# Patient Record
Sex: FEMALE | Race: WHITE | NOT HISPANIC OR LATINO | Employment: OTHER | URBAN - METROPOLITAN AREA
[De-identification: names, ages, dates, MRNs, and addresses within clinical notes are randomized per-mention and may not be internally consistent; named-entity substitution may affect disease eponyms.]

---

## 2023-08-18 ENCOUNTER — HOSPITAL ENCOUNTER (OUTPATIENT)
Dept: DATA CONVERSION | Facility: HOSPITAL | Age: 78
Discharge: HOME | End: 2023-08-18

## 2023-08-18 DIAGNOSIS — E53.8 DEFICIENCY OF OTHER SPECIFIED B GROUP VITAMINS: ICD-10-CM

## 2023-08-18 DIAGNOSIS — E83.42 HYPOMAGNESEMIA: ICD-10-CM

## 2023-08-18 DIAGNOSIS — M19.90 UNSPECIFIED OSTEOARTHRITIS, UNSPECIFIED SITE: ICD-10-CM

## 2023-08-18 LAB
ALBUMIN SERPL-MCNC: 3.9 GM/DL (ref 3.5–5)
ALBUMIN/GLOB SERPL: 1 RATIO (ref 1.5–3)
ALP BLD-CCNC: 200 U/L (ref 35–125)
ALT SERPL-CCNC: 24 U/L (ref 5–40)
ANION GAP SERPL CALCULATED.3IONS-SCNC: 11 MMOL/L (ref 0–19)
AST SERPL-CCNC: 40 U/L (ref 5–40)
BILIRUB SERPL-MCNC: 1.1 MG/DL (ref 0.1–1.2)
BUN SERPL-MCNC: 15 MG/DL (ref 8–25)
BUN/CREAT SERPL: 18.8 RATIO (ref 8–21)
CALCIUM SERPL-MCNC: 9.6 MG/DL (ref 8.5–10.4)
CHLORIDE SERPL-SCNC: 101 MMOL/L (ref 97–107)
CO2 SERPL-SCNC: 25 MMOL/L (ref 24–31)
CREAT SERPL-MCNC: 0.8 MG/DL (ref 0.4–1.6)
DEPRECATED RDW RBC AUTO: 46.3 FL (ref 37–54)
ERYTHROCYTE [DISTWIDTH] IN BLOOD BY AUTOMATED COUNT: 13.7 % (ref 11.7–15)
GFR SERPL CREATININE-BSD FRML MDRD: 76 ML/MIN/1.73 M2
GLOBULIN SER-MCNC: 3.9 G/DL (ref 1.9–3.7)
GLUCOSE SERPL-MCNC: 99 MG/DL (ref 65–99)
HCT VFR BLD AUTO: 44.8 % (ref 36–44)
HGB BLD-MCNC: 14.2 GM/DL (ref 12–15)
MAGNESIUM SERPL-MCNC: 1.7 MG/DL (ref 1.6–3.1)
MCH RBC QN AUTO: 28.7 PG (ref 26–34)
MCHC RBC AUTO-ENTMCNC: 31.7 % (ref 31–37)
MCV RBC AUTO: 90.7 FL (ref 80–100)
NRBC BLD-RTO: 0 /100 WBC
PLATELET # BLD AUTO: 326 K/UL (ref 150–450)
PMV BLD AUTO: 9.6 CU (ref 7–12.6)
POTASSIUM SERPL-SCNC: 4 MMOL/L (ref 3.4–5.1)
PROT SERPL-MCNC: 7.8 G/DL (ref 5.9–7.9)
RBC # BLD AUTO: 4.94 M/UL (ref 4–4.9)
SODIUM SERPL-SCNC: 137 MMOL/L (ref 133–145)
VIT B12 SERPL-MCNC: 1297 PG/ML (ref 211–946)
WBC # BLD AUTO: 11.4 K/UL (ref 4.5–11)

## 2023-09-15 PROBLEM — R60.9 PERIPHERAL EDEMA: Status: ACTIVE | Noted: 2023-09-15

## 2023-09-15 PROBLEM — M19.011 ARTHRITIS OF SHOULDER REGION, RIGHT: Status: ACTIVE | Noted: 2023-09-15

## 2023-09-15 PROBLEM — R60.0 PERIPHERAL EDEMA: Status: ACTIVE | Noted: 2023-09-15

## 2023-09-15 PROBLEM — E87.8 FLUID VOLUME DISORDER: Status: ACTIVE | Noted: 2023-09-15

## 2023-09-15 PROBLEM — R94.5 ABNORMAL LIVER FUNCTION: Status: ACTIVE | Noted: 2023-09-15

## 2023-09-15 PROBLEM — M16.11 ARTHRITIS OF RIGHT HIP: Status: ACTIVE | Noted: 2023-09-15

## 2023-09-15 PROBLEM — M79.605 LEFT LEG PAIN: Status: ACTIVE | Noted: 2023-09-15

## 2023-09-15 PROBLEM — M25.561 KNEE PAIN, RIGHT: Status: ACTIVE | Noted: 2023-09-15

## 2023-09-15 PROBLEM — M12.811 ROTATOR CUFF TEAR ARTHROPATHY OF RIGHT SHOULDER: Status: ACTIVE | Noted: 2023-09-15

## 2023-09-15 PROBLEM — I10 BENIGN HYPERTENSION: Status: ACTIVE | Noted: 2023-09-15

## 2023-09-15 PROBLEM — M25.552 LEFT HIP PAIN: Status: ACTIVE | Noted: 2023-09-15

## 2023-09-15 PROBLEM — F32.A CHRONIC DEPRESSION: Status: ACTIVE | Noted: 2023-09-15

## 2023-09-15 PROBLEM — E53.8 VITAMIN B12 DEFICIENCY WITHOUT ANEMIA: Status: ACTIVE | Noted: 2023-09-15

## 2023-09-15 PROBLEM — M75.101 ROTATOR CUFF TEAR ARTHROPATHY OF RIGHT SHOULDER: Status: ACTIVE | Noted: 2023-09-15

## 2023-09-15 PROBLEM — R10.9 ABDOMINAL PAIN: Status: ACTIVE | Noted: 2023-09-15

## 2023-09-15 PROBLEM — M76.899 ENTHESOPATHY OF HIP REGION: Status: ACTIVE | Noted: 2023-09-15

## 2023-09-15 PROBLEM — T84.84XA PAINFUL TOTAL KNEE REPLACEMENT (CMS-HCC): Status: ACTIVE | Noted: 2023-09-15

## 2023-09-15 PROBLEM — G47.00 INSOMNIA: Status: ACTIVE | Noted: 2023-09-15

## 2023-09-15 PROBLEM — M48.061 SPINAL STENOSIS OF LUMBAR REGION: Status: ACTIVE | Noted: 2023-09-15

## 2023-09-15 PROBLEM — G25.0 ESSENTIAL TREMOR: Status: ACTIVE | Noted: 2023-09-15

## 2023-09-15 PROBLEM — E78.00 HIGH CHOLESTEROL: Status: ACTIVE | Noted: 2023-09-15

## 2023-09-15 PROBLEM — M54.9 BACK PAIN: Status: ACTIVE | Noted: 2023-09-15

## 2023-09-15 PROBLEM — M19.90 OSTEOARTHRITIS: Status: ACTIVE | Noted: 2023-09-15

## 2023-09-15 PROBLEM — Z96.611 STATUS POST REPLACEMENT OF RIGHT SHOULDER JOINT: Status: ACTIVE | Noted: 2023-09-15

## 2023-09-15 PROBLEM — R06.89 IMPAIRED GAS EXCHANGE: Status: ACTIVE | Noted: 2023-09-15

## 2023-09-15 PROBLEM — Z91.89 AT RISK FOR BLEEDING: Status: ACTIVE | Noted: 2023-09-15

## 2023-09-15 PROBLEM — F41.8 MIXED ANXIETY AND DEPRESSIVE DISORDER: Status: ACTIVE | Noted: 2023-09-15

## 2023-09-15 PROBLEM — E66.01 SEVERE OBESITY (BMI >= 40) (MULTI): Status: ACTIVE | Noted: 2023-09-15

## 2023-09-15 PROBLEM — I82.90 VENOUS THROMBOSIS: Status: ACTIVE | Noted: 2023-09-15

## 2023-09-15 PROBLEM — N60.91 ATYPICAL DUCTAL HYPERPLASIA OF RIGHT BREAST: Status: ACTIVE | Noted: 2023-09-15

## 2023-09-15 PROBLEM — E55.9 VITAMIN D DEFICIENCY: Status: ACTIVE | Noted: 2023-09-15

## 2023-09-15 PROBLEM — R61 HYPERHIDROSIS: Status: ACTIVE | Noted: 2023-09-15

## 2023-09-15 PROBLEM — R68.89 ACTIVITY INTOLERANCE: Status: ACTIVE | Noted: 2023-09-15

## 2023-09-15 PROBLEM — K21.9 GASTROESOPHAGEAL REFLUX DISEASE: Status: ACTIVE | Noted: 2023-09-15

## 2023-09-15 PROBLEM — E87.6 HYPOKALEMIA: Status: ACTIVE | Noted: 2023-09-15

## 2023-09-15 PROBLEM — E83.42 HYPOMAGNESEMIA: Status: ACTIVE | Noted: 2023-09-15

## 2023-09-15 PROBLEM — K80.51 CALCULUS OF BILE DUCT WITH OBSTRUCTION: Status: ACTIVE | Noted: 2023-09-15

## 2023-09-15 PROBLEM — E53.0 VITAMIN B2 DEFICIENCY: Status: ACTIVE | Noted: 2023-09-15

## 2023-09-15 PROBLEM — K44.9 HIATAL HERNIA: Status: ACTIVE | Noted: 2023-09-15

## 2023-09-15 PROBLEM — N95.9 MENOPAUSAL AND POSTMENOPAUSAL DISORDER: Status: ACTIVE | Noted: 2023-09-15

## 2023-09-15 PROBLEM — R89.9 ABNORMAL LABORATORY TEST RESULT: Status: ACTIVE | Noted: 2023-09-15

## 2023-09-15 PROBLEM — K76.0 FATTY LIVER: Status: ACTIVE | Noted: 2023-09-15

## 2023-09-15 PROBLEM — Z96.659 PAINFUL TOTAL KNEE REPLACEMENT (CMS-HCC): Status: ACTIVE | Noted: 2023-09-15

## 2023-09-15 PROBLEM — R53.83 FATIGUE: Status: ACTIVE | Noted: 2023-09-15

## 2023-09-15 PROBLEM — M85.80 OSTEOPENIA: Status: ACTIVE | Noted: 2023-09-15

## 2023-09-15 RX ORDER — ACETAMINOPHEN 500 MG
TABLET ORAL
COMMUNITY

## 2023-09-15 RX ORDER — VIT C/E/ZN/COPPR/LUTEIN/ZEAXAN 250MG-90MG
CAPSULE ORAL
COMMUNITY

## 2023-09-15 RX ORDER — SODIUM PHOSPHATE, MONOBASIC, MONOHYDRATE, SODIUM PHOSPHATE, DIBASIC ANHYDROUS 1.102; .398 G/1; G/1
TABLET ORAL
COMMUNITY
Start: 2016-02-03 | End: 2023-10-12 | Stop reason: SDUPTHER

## 2023-09-15 RX ORDER — OMEPRAZOLE 40 MG/1
CAPSULE, DELAYED RELEASE ORAL
COMMUNITY
Start: 2016-02-12 | End: 2023-11-22 | Stop reason: ALTCHOICE

## 2023-09-15 RX ORDER — PROPRANOLOL HYDROCHLORIDE 20 MG/1
TABLET ORAL
COMMUNITY
Start: 2020-12-01 | End: 2023-11-22 | Stop reason: ALTCHOICE

## 2023-09-15 RX ORDER — ACETAMINOPHEN 500 MG
TABLET ORAL
COMMUNITY
End: 2024-02-29 | Stop reason: ALTCHOICE

## 2023-09-15 RX ORDER — DOCUSATE SODIUM 100 MG/1
100 CAPSULE, LIQUID FILLED ORAL 2 TIMES DAILY PRN
COMMUNITY
Start: 2016-08-12

## 2023-09-15 RX ORDER — OMEPRAZOLE 20 MG/1
1 CAPSULE, DELAYED RELEASE ORAL DAILY
COMMUNITY
End: 2023-11-22 | Stop reason: ALTCHOICE

## 2023-09-15 RX ORDER — CALCIUM CITRATE/VITAMIN D3 315MG-6.25
TABLET ORAL
COMMUNITY

## 2023-09-15 RX ORDER — LORATADINE 10 MG/1
TABLET ORAL
COMMUNITY

## 2023-09-15 RX ORDER — GABAPENTIN 100 MG/1
1 CAPSULE ORAL 2 TIMES DAILY
COMMUNITY
Start: 2020-06-17 | End: 2023-11-22 | Stop reason: SDUPTHER

## 2023-09-15 RX ORDER — DULOXETIN HYDROCHLORIDE 60 MG/1
CAPSULE, DELAYED RELEASE ORAL
COMMUNITY
End: 2023-11-22 | Stop reason: ALTCHOICE

## 2023-09-15 RX ORDER — TRAMADOL HYDROCHLORIDE 50 MG/1
TABLET ORAL
COMMUNITY
Start: 2013-01-02 | End: 2024-02-29 | Stop reason: ALTCHOICE

## 2023-09-15 RX ORDER — BACLOFEN 20 MG
TABLET ORAL
COMMUNITY
Start: 2023-01-18 | End: 2023-10-17

## 2023-09-15 RX ORDER — OXYCODONE HYDROCHLORIDE 5 MG/1
5 TABLET ORAL EVERY 4 HOURS PRN
COMMUNITY
End: 2024-02-29 | Stop reason: ALTCHOICE

## 2023-09-15 RX ORDER — ACETAMINOPHEN 325 MG/1
TABLET ORAL
COMMUNITY

## 2023-09-15 RX ORDER — LANOLIN ALCOHOL/MO/W.PET/CERES
1 CREAM (GRAM) TOPICAL WEEKLY
COMMUNITY

## 2023-09-15 RX ORDER — TIZANIDINE 2 MG/1
TABLET ORAL
COMMUNITY
Start: 2013-03-19 | End: 2024-02-29 | Stop reason: ALTCHOICE

## 2023-09-15 RX ORDER — GLUCOSAMINE/CHONDRO SU A 500-400 MG
1 TABLET ORAL DAILY
COMMUNITY
End: 2024-02-29 | Stop reason: ALTCHOICE

## 2023-09-15 RX ORDER — CHOLECALCIFEROL (VITAMIN D3) 50 MCG
1 TABLET ORAL DAILY
COMMUNITY

## 2023-09-15 RX ORDER — MELOXICAM 15 MG/1
TABLET ORAL
COMMUNITY
Start: 2013-07-10 | End: 2023-10-12

## 2023-09-15 RX ORDER — DULOXETIN HYDROCHLORIDE 30 MG/1
1 CAPSULE, DELAYED RELEASE ORAL DAILY
COMMUNITY
Start: 2015-07-21 | End: 2023-11-22 | Stop reason: ALTCHOICE

## 2023-09-15 RX ORDER — TIZANIDINE 4 MG/1
4 TABLET ORAL DAILY PRN
COMMUNITY
End: 2024-02-29 | Stop reason: ALTCHOICE

## 2023-09-15 RX ORDER — DESVENLAFAXINE 50 MG/1
TABLET, EXTENDED RELEASE ORAL
COMMUNITY
Start: 2013-01-02 | End: 2024-02-29 | Stop reason: ALTCHOICE

## 2023-09-15 RX ORDER — LOSARTAN POTASSIUM AND HYDROCHLOROTHIAZIDE 12.5; 5 MG/1; MG/1
TABLET ORAL
COMMUNITY
Start: 2013-07-24 | End: 2024-02-29 | Stop reason: ALTCHOICE

## 2023-09-15 RX ORDER — LOSARTAN POTASSIUM 50 MG/1
TABLET ORAL
COMMUNITY
Start: 2017-10-25 | End: 2023-11-22 | Stop reason: SDUPTHER

## 2023-09-15 RX ORDER — OMEPRAZOLE 20 MG/1
20 TABLET, DELAYED RELEASE ORAL DAILY
COMMUNITY
End: 2024-02-29 | Stop reason: ALTCHOICE

## 2023-09-15 RX ORDER — ESZOPICLONE 1 MG/1
TABLET, FILM COATED ORAL
COMMUNITY
Start: 2014-06-03 | End: 2024-02-29 | Stop reason: ALTCHOICE

## 2023-09-15 RX ORDER — DULOXETIN HYDROCHLORIDE 20 MG/1
CAPSULE, DELAYED RELEASE ORAL
COMMUNITY
Start: 2016-01-19 | End: 2023-11-22 | Stop reason: ALTCHOICE

## 2023-09-15 RX ORDER — OXYCODONE AND ACETAMINOPHEN 5; 325 MG/1; MG/1
TABLET ORAL
COMMUNITY
Start: 2016-08-23 | End: 2024-02-29 | Stop reason: ALTCHOICE

## 2023-09-15 RX ORDER — ESZOPICLONE 2 MG/1
TABLET, FILM COATED ORAL
COMMUNITY
End: 2024-02-29 | Stop reason: ALTCHOICE

## 2023-09-15 RX ORDER — WARFARIN 4 MG/1
TABLET ORAL
COMMUNITY
Start: 2014-07-23 | End: 2023-10-12 | Stop reason: SDUPTHER

## 2023-10-12 DIAGNOSIS — M19.90 OTHER TYPE OF OSTEOARTHRITIS, UNSPECIFIED SITE: ICD-10-CM

## 2023-10-12 RX ORDER — MELOXICAM 15 MG/1
15 TABLET ORAL DAILY
Qty: 90 TABLET | Refills: 2 | Status: SHIPPED | OUTPATIENT
Start: 2023-10-12 | End: 2023-11-22 | Stop reason: SDUPTHER

## 2023-10-17 DIAGNOSIS — E83.42 HYPOMAGNESEMIA: Primary | ICD-10-CM

## 2023-10-17 RX ORDER — BACLOFEN 20 MG
1 TABLET ORAL DAILY
Qty: 90 TABLET | Refills: 2 | Status: SHIPPED | OUTPATIENT
Start: 2023-10-17

## 2023-11-22 ENCOUNTER — TELEPHONE (OUTPATIENT)
Dept: PRIMARY CARE | Facility: CLINIC | Age: 78
End: 2023-11-22
Payer: COMMERCIAL

## 2023-11-22 DIAGNOSIS — M19.90 OTHER TYPE OF OSTEOARTHRITIS, UNSPECIFIED SITE: ICD-10-CM

## 2023-11-22 DIAGNOSIS — K21.9 GASTROESOPHAGEAL REFLUX DISEASE, UNSPECIFIED WHETHER ESOPHAGITIS PRESENT: ICD-10-CM

## 2023-11-22 DIAGNOSIS — I10 BENIGN HYPERTENSION: ICD-10-CM

## 2023-11-22 DIAGNOSIS — F41.8 MIXED ANXIETY AND DEPRESSIVE DISORDER: ICD-10-CM

## 2023-11-22 RX ORDER — GABAPENTIN 100 MG/1
100 CAPSULE ORAL 2 TIMES DAILY
Qty: 180 CAPSULE | Refills: 0 | Status: SHIPPED | OUTPATIENT
Start: 2023-11-22 | End: 2024-03-04 | Stop reason: SDUPTHER

## 2023-11-22 RX ORDER — OMEPRAZOLE 20 MG/1
20 CAPSULE, DELAYED RELEASE ORAL DAILY
Qty: 90 CAPSULE | Refills: 0 | Status: SHIPPED | OUTPATIENT
Start: 2023-11-22 | End: 2024-05-03 | Stop reason: SDUPTHER

## 2023-11-22 RX ORDER — MELOXICAM 15 MG/1
15 TABLET ORAL DAILY
Qty: 90 TABLET | Refills: 0 | Status: SHIPPED | OUTPATIENT
Start: 2023-11-22 | End: 2024-04-11 | Stop reason: SDUPTHER

## 2023-11-22 RX ORDER — LOSARTAN POTASSIUM 50 MG/1
TABLET ORAL
Qty: 90 TABLET | Refills: 0 | Status: SHIPPED | OUTPATIENT
Start: 2023-11-22 | End: 2024-02-12

## 2023-11-22 RX ORDER — PROPRANOLOL HYDROCHLORIDE 20 MG/1
TABLET ORAL
Qty: 360 TABLET | Refills: 0 | Status: SHIPPED | OUTPATIENT
Start: 2023-11-22 | End: 2024-03-21 | Stop reason: SDUPTHER

## 2023-11-22 RX ORDER — DULOXETIN HYDROCHLORIDE 30 MG/1
30 CAPSULE, DELAYED RELEASE ORAL DAILY
Qty: 90 CAPSULE | Refills: 0 | Status: SHIPPED | OUTPATIENT
Start: 2023-11-22 | End: 2024-02-13

## 2023-11-22 NOTE — TELEPHONE ENCOUNTER
Patient requesting 90 day refills on Propanolol 20 mg 4 times a day, Omeprazole 20 mg once a day, Meloxicam 15 mg once a day, Duloxetine 30 mg once a day, Losartan 50 mg once a day, and Gabapentin 100 mg twice a day to go to [facility]. LOV 8/23. NOV 2/24.

## 2024-01-08 ENCOUNTER — HOSPITAL ENCOUNTER (OUTPATIENT)
Dept: RADIOLOGY | Facility: HOSPITAL | Age: 79
Discharge: HOME | End: 2024-01-08
Payer: COMMERCIAL

## 2024-01-08 ENCOUNTER — LAB (OUTPATIENT)
Dept: LAB | Facility: LAB | Age: 79
End: 2024-01-08
Payer: COMMERCIAL

## 2024-01-08 VITALS — WEIGHT: 240 LBS | BODY MASS INDEX: 42.52 KG/M2 | HEIGHT: 63 IN

## 2024-01-08 DIAGNOSIS — Z12.31 ENCOUNTER FOR SCREENING MAMMOGRAM FOR MALIGNANT NEOPLASM OF BREAST: ICD-10-CM

## 2024-01-08 DIAGNOSIS — I10 ESSENTIAL (PRIMARY) HYPERTENSION: Primary | ICD-10-CM

## 2024-01-08 DIAGNOSIS — E53.8 DEFICIENCY OF OTHER SPECIFIED B GROUP VITAMINS: ICD-10-CM

## 2024-01-08 LAB
ALBUMIN SERPL-MCNC: 4 G/DL (ref 3.5–5)
ALP BLD-CCNC: 175 U/L (ref 35–125)
ALT SERPL-CCNC: 16 U/L (ref 5–40)
ANION GAP SERPL CALC-SCNC: 10 MMOL/L
AST SERPL-CCNC: 23 U/L (ref 5–40)
BILIRUB SERPL-MCNC: 1 MG/DL (ref 0.1–1.2)
BUN SERPL-MCNC: 17 MG/DL (ref 8–25)
CALCIUM SERPL-MCNC: 9.2 MG/DL (ref 8.5–10.4)
CHLORIDE SERPL-SCNC: 100 MMOL/L (ref 97–107)
CO2 SERPL-SCNC: 29 MMOL/L (ref 24–31)
CREAT SERPL-MCNC: 0.9 MG/DL (ref 0.4–1.6)
EGFRCR SERPLBLD CKD-EPI 2021: 66 ML/MIN/1.73M*2
ERYTHROCYTE [DISTWIDTH] IN BLOOD BY AUTOMATED COUNT: 13.6 % (ref 11.5–14.5)
GLUCOSE SERPL-MCNC: 102 MG/DL (ref 65–99)
HCT VFR BLD AUTO: 45.7 % (ref 36–46)
HGB BLD-MCNC: 14.7 G/DL (ref 12–16)
MCH RBC QN AUTO: 29.3 PG (ref 26–34)
MCHC RBC AUTO-ENTMCNC: 32.2 G/DL (ref 32–36)
MCV RBC AUTO: 91 FL (ref 80–100)
NRBC BLD-RTO: 0 /100 WBCS (ref 0–0)
PLATELET # BLD AUTO: 310 X10*3/UL (ref 150–450)
POTASSIUM SERPL-SCNC: 4.1 MMOL/L (ref 3.4–5.1)
PROT SERPL-MCNC: 7.1 G/DL (ref 5.9–7.9)
RBC # BLD AUTO: 5.02 X10*6/UL (ref 4–5.2)
SODIUM SERPL-SCNC: 139 MMOL/L (ref 133–145)
VIT B12 SERPL-MCNC: 555 PG/ML (ref 211–946)
WBC # BLD AUTO: 7.8 X10*3/UL (ref 4.4–11.3)

## 2024-01-08 PROCEDURE — 85027 COMPLETE CBC AUTOMATED: CPT

## 2024-01-08 PROCEDURE — 80053 COMPREHEN METABOLIC PANEL: CPT

## 2024-01-08 PROCEDURE — 77067 SCR MAMMO BI INCL CAD: CPT

## 2024-01-08 PROCEDURE — 82607 VITAMIN B-12: CPT

## 2024-01-08 PROCEDURE — 36415 COLL VENOUS BLD VENIPUNCTURE: CPT

## 2024-02-07 ENCOUNTER — TELEPHONE (OUTPATIENT)
Dept: PRIMARY CARE | Facility: CLINIC | Age: 79
End: 2024-02-07
Payer: COMMERCIAL

## 2024-02-11 DIAGNOSIS — I10 BENIGN HYPERTENSION: ICD-10-CM

## 2024-02-12 DIAGNOSIS — F41.8 MIXED ANXIETY AND DEPRESSIVE DISORDER: ICD-10-CM

## 2024-02-12 PROBLEM — N95.9 UNSPECIFIED MENOPAUSAL AND PERIMENOPAUSAL DISORDER: Status: RESOLVED | Noted: 2023-09-15 | Resolved: 2024-02-12

## 2024-02-12 PROBLEM — E78.00 HYPERCHOLESTEROLEMIA: Status: RESOLVED | Noted: 2023-09-15 | Resolved: 2024-02-12

## 2024-02-12 PROBLEM — Z96.611 HISTORY OF ARTHROPLASTY OF RIGHT SHOULDER: Status: RESOLVED | Noted: 2023-09-15 | Resolved: 2024-02-12

## 2024-02-12 PROBLEM — Z96.659 PAIN DUE TO KNEE JOINT PROSTHESIS (CMS-HCC): Status: RESOLVED | Noted: 2023-09-15 | Resolved: 2024-02-12

## 2024-02-12 PROBLEM — N60.91: Status: RESOLVED | Noted: 2023-09-15 | Resolved: 2024-02-12

## 2024-02-12 PROBLEM — Z86.79 HISTORY OF HYPERTENSION: Status: RESOLVED | Noted: 2024-02-12 | Resolved: 2024-02-12

## 2024-02-12 PROBLEM — M25.512 PAIN OF LEFT SHOULDER REGION: Status: RESOLVED | Noted: 2024-02-12 | Resolved: 2024-02-12

## 2024-02-12 PROBLEM — M25.559 ARTHRALGIA OF HIP: Status: RESOLVED | Noted: 2023-09-15 | Resolved: 2024-02-12

## 2024-02-12 PROBLEM — M12.811 ROTATOR CUFF ARTHROPATHY OF RIGHT SHOULDER: Status: RESOLVED | Noted: 2023-09-15 | Resolved: 2024-02-12

## 2024-02-12 PROBLEM — T84.84XA PAIN DUE TO KNEE JOINT PROSTHESIS (CMS-HCC): Status: RESOLVED | Noted: 2023-09-15 | Resolved: 2024-02-12

## 2024-02-12 PROBLEM — K76.0 STEATOSIS OF LIVER: Status: RESOLVED | Noted: 2023-09-15 | Resolved: 2024-02-12

## 2024-02-12 RX ORDER — LOSARTAN POTASSIUM 50 MG/1
TABLET ORAL
Qty: 90 TABLET | Refills: 0 | Status: SHIPPED | OUTPATIENT
Start: 2024-02-12 | End: 2024-05-06

## 2024-02-13 RX ORDER — DULOXETIN HYDROCHLORIDE 30 MG/1
30 CAPSULE, DELAYED RELEASE ORAL DAILY
Qty: 90 CAPSULE | Refills: 0 | Status: SHIPPED | OUTPATIENT
Start: 2024-02-13 | End: 2024-05-06

## 2024-02-29 ENCOUNTER — OFFICE VISIT (OUTPATIENT)
Dept: PRIMARY CARE | Facility: CLINIC | Age: 79
End: 2024-02-29
Payer: COMMERCIAL

## 2024-02-29 VITALS
BODY MASS INDEX: 42.88 KG/M2 | SYSTOLIC BLOOD PRESSURE: 122 MMHG | HEART RATE: 80 BPM | DIASTOLIC BLOOD PRESSURE: 84 MMHG | OXYGEN SATURATION: 97 % | HEIGHT: 63 IN | TEMPERATURE: 97.8 F | WEIGHT: 242 LBS

## 2024-02-29 DIAGNOSIS — R73.01 IMPAIRED FASTING BLOOD SUGAR: ICD-10-CM

## 2024-02-29 DIAGNOSIS — I10 BENIGN HYPERTENSION: Primary | ICD-10-CM

## 2024-02-29 DIAGNOSIS — E55.9 VITAMIN D DEFICIENCY: ICD-10-CM

## 2024-02-29 DIAGNOSIS — E53.8 VITAMIN B12 DEFICIENCY WITHOUT ANEMIA: ICD-10-CM

## 2024-02-29 DIAGNOSIS — E78.2 MIXED HYPERLIPIDEMIA: ICD-10-CM

## 2024-02-29 DIAGNOSIS — K76.0 FATTY LIVER: ICD-10-CM

## 2024-02-29 DIAGNOSIS — E78.00 HIGH CHOLESTEROL: ICD-10-CM

## 2024-02-29 DIAGNOSIS — M48.061 SPINAL STENOSIS OF LUMBAR REGION WITHOUT NEUROGENIC CLAUDICATION: ICD-10-CM

## 2024-02-29 DIAGNOSIS — G25.0 ESSENTIAL PALATAL TREMOR: ICD-10-CM

## 2024-02-29 PROBLEM — T84.84XA PAIN DUE TO KNEE JOINT PROSTHESIS (CMS-HCC): Status: ACTIVE | Noted: 2023-09-15

## 2024-02-29 PROBLEM — M25.559 ARTHRALGIA OF HIP: Status: ACTIVE | Noted: 2023-09-15

## 2024-02-29 PROBLEM — M25.569 KNEE PAIN: Status: ACTIVE | Noted: 2023-09-15

## 2024-02-29 PROBLEM — M79.605 PAIN OF LEFT LOWER EXTREMITY: Status: ACTIVE | Noted: 2023-09-15

## 2024-02-29 PROBLEM — Z96.659 PAIN DUE TO KNEE JOINT PROSTHESIS (CMS-HCC): Status: ACTIVE | Noted: 2023-09-15

## 2024-02-29 PROBLEM — M25.511 PAIN OF RIGHT SHOULDER REGION: Status: ACTIVE | Noted: 2024-02-12

## 2024-02-29 PROBLEM — M12.811 ROTATOR CUFF ARTHROPATHY OF RIGHT SHOULDER: Status: ACTIVE | Noted: 2023-09-15

## 2024-02-29 PROCEDURE — 1158F ADVNC CARE PLAN TLK DOCD: CPT | Performed by: INTERNAL MEDICINE

## 2024-02-29 PROCEDURE — 1159F MED LIST DOCD IN RCRD: CPT | Performed by: INTERNAL MEDICINE

## 2024-02-29 PROCEDURE — 3079F DIAST BP 80-89 MM HG: CPT | Performed by: INTERNAL MEDICINE

## 2024-02-29 PROCEDURE — 99214 OFFICE O/P EST MOD 30 MIN: CPT | Performed by: INTERNAL MEDICINE

## 2024-02-29 PROCEDURE — 3074F SYST BP LT 130 MM HG: CPT | Performed by: INTERNAL MEDICINE

## 2024-02-29 PROCEDURE — 1125F AMNT PAIN NOTED PAIN PRSNT: CPT | Performed by: INTERNAL MEDICINE

## 2024-02-29 PROCEDURE — 1123F ACP DISCUSS/DSCN MKR DOCD: CPT | Performed by: INTERNAL MEDICINE

## 2024-02-29 PROCEDURE — 1036F TOBACCO NON-USER: CPT | Performed by: INTERNAL MEDICINE

## 2024-02-29 ASSESSMENT — ENCOUNTER SYMPTOMS
OCCASIONAL FEELINGS OF UNSTEADINESS: 1
LOSS OF SENSATION IN FEET: 0
HYPERTENSION: 1
SHORTNESS OF BREATH: 0
PALPITATIONS: 0
DEPRESSION: 0

## 2024-02-29 ASSESSMENT — PATIENT HEALTH QUESTIONNAIRE - PHQ9
1. LITTLE INTEREST OR PLEASURE IN DOING THINGS: NOT AT ALL
2. FEELING DOWN, DEPRESSED OR HOPELESS: NOT AT ALL
SUM OF ALL RESPONSES TO PHQ9 QUESTIONS 1 AND 2: 0

## 2024-02-29 ASSESSMENT — PAIN SCALES - GENERAL: PAINLEVEL: 4

## 2024-02-29 NOTE — PATIENT INSTRUCTIONS
wellness September, labs 1-2 weeks before follow up. Bring in Living Will, POA health papers on next visit to scan in chart    Diagnoses and all orders for this visit:  Benign hypertension  Comments:  BP doing OK. Levels OK.  High cholesterol  Vitamin B12 deficiency without anemia  Comments:  B12 levels doing OK.  Orders:  -     Vitamin B12; Future  Fatty liver  Comments:  levels stable.  Vitamin D deficiency  -     Vitamin D 25-Hydroxy,Total (for eval of Vitamin D levels); Future  Essential palatal tremor  Comments:  stable.  Spinal stenosis of lumbar region without neurogenic claudication  Comments:  lidocaine 4% poatch on for 12 hrs off for 12 hrs. Right lower back. PT is not improving.  Impaired fasting blood sugar  Comments:  low sugar diet, recheck labs on follow up.  Orders:  -     Hemoglobin A1C; Future  -     CBC; Future  Mixed hyperlipidemia  -     Lipid Panel; Future  -     Comprehensive Metabolic Panel; Future

## 2024-02-29 NOTE — PROGRESS NOTES
[facility]: [facility]  PROGRESS NOTE      Nuria Valdivia is a 78 y.o. female that is presenting today for Hypertension (6 mo fu).    Assessment/Plan   Diagnoses and all orders for this visit:  Benign hypertension  Comments:  BP doing OK. Levels OK.  High cholesterol  Vitamin B12 deficiency without anemia  Comments:  B12 levels doing OK.  Orders:  -     Vitamin B12; Future  Fatty liver  Comments:  levels stable.  Vitamin D deficiency  -     Vitamin D 25-Hydroxy,Total (for eval of Vitamin D levels); Future  Essential palatal tremor  Comments:  stable.  Spinal stenosis of lumbar region without neurogenic claudication  Comments:  lidocaine 4% poatch on for 12 hrs off for 12 hrs. Right lower back. PT is not improving.  Impaired fasting blood sugar  Comments:  low sugar diet, recheck labs on follow up.  Orders:  -     Hemoglobin A1C; Future  -     CBC; Future  Mixed hyperlipidemia  -     Lipid Panel; Future  -     Comprehensive Metabolic Panel; Future    Subjective   IFBS, HTN, DJD, SS /LR no radiation right lower back Lidocaine 4 %, then PT. Labs OK, cane at all times, diet reviwed, weight loss reviewed.    Hypertension  Pertinent negatives include no chest pain, palpitations or shortness of breath.     Review of Systems   Respiratory:  Negative for shortness of breath.    Cardiovascular:  Negative for chest pain and palpitations.   All other systems reviewed and are negative.     Objective   Vitals:    02/29/24 1005   BP: 122/84   Pulse: 80   Temp: 36.6 °C (97.8 °F)   SpO2: 97%      Body mass index is 42.87 kg/m².  Physical Exam  Constitutional:       General: She is not in acute distress.     Appearance: She is obese.      Comments: cane   HENT:      Head: Normocephalic and atraumatic.      Right Ear: Tympanic membrane normal.      Left Ear: Tympanic membrane normal.      Mouth/Throat:      Mouth: Mucous membranes are moist.      Pharynx: Oropharynx is clear.   Eyes:      Extraocular  "Movements: Extraocular movements intact.      Conjunctiva/sclera: Conjunctivae normal.      Pupils: Pupils are equal, round, and reactive to light.   Cardiovascular:      Rate and Rhythm: Normal rate and regular rhythm.   Pulmonary:      Breath sounds: Normal breath sounds.   Abdominal:      General: Bowel sounds are normal.      Palpations: Abdomen is soft. There is no mass.   Musculoskeletal:         General: Normal range of motion.      Cervical back: Neck supple. No tenderness.   Skin:     General: Skin is warm and dry.   Neurological:      General: No focal deficit present.      Mental Status: She is oriented to person, place, and time.       Diagnostic Results   Lab Results   Component Value Date    GLUCOSE 102 (H) 01/08/2024    CALCIUM 9.2 01/08/2024     01/08/2024    K 4.1 01/08/2024    CO2 29 01/08/2024     01/08/2024    BUN 17 01/08/2024    CREATININE 0.90 01/08/2024     Lab Results   Component Value Date    ALT 16 01/08/2024    AST 23 01/08/2024    ALKPHOS 175 (H) 01/08/2024    BILITOT 1.0 01/08/2024     Lab Results   Component Value Date    WBC 7.8 01/08/2024    HGB 14.7 01/08/2024    HCT 45.7 01/08/2024    MCV 91 01/08/2024     01/08/2024     Lab Results   Component Value Date    CHOL 198 06/10/2022    CHOL 168 12/19/2018     Lab Results   Component Value Date    HDL 54 06/10/2022    HDL 57 12/19/2018     Lab Results   Component Value Date    LDLCALC 124 06/10/2022    LDLCALC 94 12/19/2018     Lab Results   Component Value Date    TRIG 102 06/10/2022    TRIG 85 12/19/2018     No components found for: \"CHOLHDL\"  Lab Results   Component Value Date    HGBA1C 5.1 06/10/2022     Other labs not included in the list above were reviewed either before or during this encounter.    History    Past Medical History:   Diagnosis Date    Arthralgia of hip 09/15/2023    Atypical hyperplasia of lactiferous duct of right breast 09/15/2023    Benign hypertension 09/15/2023    Essential tremor 09/15/2023 "    Fatty liver 09/15/2023    High cholesterol 09/15/2023    History of arthroplasty of right shoulder 09/15/2023    History of hypertension 02/12/2024    Hypercholesterolemia 09/15/2023    Mixed anxiety depressive disorder 09/15/2023    Pain due to knee joint prosthesis (CMS/HCC) 09/15/2023    Pain of left shoulder region 02/12/2024    Personal history of other diseases of the circulatory system     History of hypertension    Personal history of other diseases of the musculoskeletal system and connective tissue     Personal history of arthritis    Personal history of other diseases of the musculoskeletal system and connective tissue     Personal history of spinal stenosis    Primary localized osteoarthrosis 11/12/2008    Rotator cuff arthropathy of right shoulder 09/15/2023    Spinal stenosis of lumbar region 09/15/2023    Steatosis of liver 09/15/2023    Unspecified menopausal and perimenopausal disorder 09/15/2023    Vitamin B12 deficiency without anemia 09/15/2023     Past Surgical History:   Procedure Laterality Date    BI MAMMO GUIDED LOCALIZATION BREAST RIGHT Right 10/28/2013    BI MAMMO GUIDED LOCALIZATION BREAST RIGHT [facility]    BREAST BIOPSY Left     Excisional Bx 2005 - Benign    BREAST BIOPSY Right     Excisional Bx 2013 - Benign    BREAST BIOPSY Right     Stereotactic Bx 2013 - [facility]    TONSILLECTOMY  04/30/2014    Tonsillectomy     Family History   Problem Relation Name Age of Onset    Heart failure Mother      Restless legs syndrome Mother      Heart disease Mother      Diabetes Mother      Tremor Mother          essential    Testicular cancer Father  62    Heart disease Father      Lupus Sister      Pulmonary embolism Sister      Tremor Brother          essential     Social History     Socioeconomic History    Marital status:      Spouse name: Not on file    Number of children: Not on file    Years of education: Not on file    Highest education level: Not on file   Occupational  History    Not on file   Tobacco Use    Smoking status: Never     Passive exposure: Never    Smokeless tobacco: Never   Vaping Use    Vaping Use: Never used   Substance and Sexual Activity    Alcohol use: Yes    Drug use: Never    Sexual activity: Not on file   Other Topics Concern    Not on file   Social History Narrative    Not on file     Social Determinants of Health     Financial Resource Strain: Not on file   Food Insecurity: Not on file   Transportation Needs: Not on file   Physical Activity: Not on file   Stress: Not on file   Social Connections: Not on file   Intimate Partner Violence: Not on file   Housing Stability: Not on file     Allergies   Allergen Reactions    Morphine Other     nightmares     Current Outpatient Medications on File Prior to Visit   Medication Sig Dispense Refill    acetaminophen (TylenoL) 325 mg tablet 2 tablets as needed Orally every 6 hrs      calcium citrate-vitamin D3 315 mg-6.25 mcg (250 unit) tablet 2 tab(s) orally once a day      cholecalciferol (Vitamin D-3) 50 MCG (2000 UT) tablet Take 1 tablet (2,000 Units) by mouth once daily.      cyanocobalamin (Vitamin B-12) 1,000 mcg tablet Take 1 tablet (1,000 mcg) by mouth once daily. weekly      docusate sodium (Colace) 100 mg capsule Take 1 capsule (100 mg) by mouth 2 times a day as needed for constipation.      DULoxetine (Cymbalta) 30 mg DR capsule TAKE 1 CAPSULE ONCE DAILY 90 capsule 0    gabapentin (Neurontin) 100 mg capsule Take 1 capsule (100 mg) by mouth 2 times a day. 180 capsule 0    glucosam/chond/hyalu/CF borate (MOVE FREE JOINT HEALTH ORAL) Take 1 tablet by mouth once daily.      L. acidophilus/Bifid. animalis 15.5 billion cell capsule 1 cap(s) orally once a day      loratadine (Claritin) 10 mg tablet        losartan (Cozaar) 50 mg tablet TAKE 1 TABLET ONCE DAILY 90 tablet 0    magnesium oxide 500 mg tablet TAKE 1 TABLET BY MOUTH DAILY WITH FOOD 90 tablet 2    melatonin 5 mg tablet 1 tablet in the evening Orally Once a  day      meloxicam (Mobic) 15 mg tablet Take 1 tablet (15 mg) by mouth once daily. 90 tablet 0    omeprazole (PriLOSEC) 20 mg DR capsule Take 1 capsule (20 mg) by mouth once daily. 90 capsule 0    propranolol (Inderal) 20 mg tablet 2 tablets in morning,  2 tablet in afternoon Orally daily 360 tablet 0    vit C,H-Fl-updit-lutein-zeaxan (PreserVision AREDS-2) 250-90-40-1 mg capsule 1 cap(s) orally once a day      [DISCONTINUED] aspirin (ASPIR-81 ORAL) TABS      [DISCONTINUED] cholecalciferol (Vitamin D-3) 50 mcg (2,000 unit) capsule        [DISCONTINUED] desvenlafaxine (Pristiq) 50 mg 24 hr tablet        [DISCONTINUED] eszopiclone (Lunesta) 1 mg tablet        [DISCONTINUED] eszopiclone (Lunesta) 2 mg tablet        [DISCONTINUED] glucosamine-chondroitin 500-400 mg tablet Take 1 tablet by mouth once daily.      [DISCONTINUED] glucosamine/chondro gunter A/C/Mn (GLUCOSAMINE-CHONDROITIN COMPLX ORAL) glu-lily-MSM#[id number]-J-Mn-cindy-bor (Glucosamine-Chondroitin Complx) 750 mg-625 mg-1,000 unit-30 mg-1 mg-50 mg-1.5 mg Tablet      [DISCONTINUED] losartan-hydrochlorothiazide (Hyzaar) 50-12.5 mg tablet        [DISCONTINUED] omeprazole OTC (PriLOSEC OTC) 20 mg EC tablet Take 1 tablet (20 mg) by mouth once daily.      [DISCONTINUED] oxyCODONE (Roxicodone) 5 mg immediate release tablet Take 1 tablet (5 mg) by mouth every 4 hours if needed for moderate pain (4 - 6).      [DISCONTINUED] oxyCODONE-acetaminophen (Percocet) 5-325 mg tablet TAKE 1 TABLET EVERY 4 TO 6 HOURS AS NEEDED FOR PAIN.      [DISCONTINUED] raNITIdine (Zantac) 150 mg capsule        [DISCONTINUED] tiZANidine (Zanaflex) 2 mg tablet        [DISCONTINUED] tiZANidine (Zanaflex) 4 mg tablet Take 1 tablet (4 mg) by mouth once daily as needed (mild pain).      [DISCONTINUED] traMADol (Ultram) 50 mg tablet         No current facility-administered medications on file prior to visit.     Immunization History   Administered Date(s) Administered    Flu vaccine, quadrivalent, high-dose,  preservative free, age 65y+ (FLUZONE) 11/16/2021, 10/24/2022, 11/13/2023    Influenza, High Dose Seasonal, Preservative Free 10/26/2016, 09/19/2017, 12/12/2018, 10/01/2019    Influenza, Seasonal, Quadrivalent, Adjuvanted 10/03/2020    Influenza, Unspecified 10/24/2023    Influenza, seasonal, injectable 10/13/2010, 12/16/2011, 10/15/2012    Influenza, trivalent, adjuvanted 11/19/2019    Pfizer COVID-19 vaccine, Fall 2023, 12 years and older, (30mcg/0.3mL) 11/13/2023    Pfizer COVID-19 vaccine, bivalent, age 12 years and older (30 mcg/0.3 mL) 09/15/2022    Pfizer Gray Cap SARS-CoV-2 04/13/2022    Pfizer Purple Cap SARS-CoV-2 02/24/2021, 03/23/2021, 10/05/2021    Pneumococcal conjugate vaccine, 13-valent (PREVNAR 13) 01/01/2016    Pneumococcal polysaccharide vaccine, 23-valent, age 2 years and older (PNEUMOVAX 23) 06/28/2012, 09/19/2017    Td vaccine, age 7 years and older (TDVAX) 10/01/2002    Tdap vaccine, age 7 year and older (BOOSTRIX, ADACEL) 12/16/2011, 05/29/2020    Zoster vaccine, recombinant, adult (SHINGRIX) 09/15/2022, 05/23/2023    Zoster, live 11/01/2009     Patient's medical history was reviewed and updated either before or during this encounter.       Cole Wong MD

## 2024-03-04 ENCOUNTER — TELEPHONE (OUTPATIENT)
Dept: PRIMARY CARE | Facility: CLINIC | Age: 79
End: 2024-03-04
Payer: COMMERCIAL

## 2024-03-04 DIAGNOSIS — M19.90 OTHER TYPE OF OSTEOARTHRITIS, UNSPECIFIED SITE: ICD-10-CM

## 2024-03-04 RX ORDER — GABAPENTIN 100 MG/1
100 CAPSULE ORAL 2 TIMES DAILY
Qty: 180 CAPSULE | Refills: 2 | Status: SHIPPED | OUTPATIENT
Start: 2024-03-04

## 2024-03-04 NOTE — TELEPHONE ENCOUNTER
LV 2/29/24, NV 9/25/24, refill gabapentin 100mg last filled 11/22/23    [facility]

## 2024-03-21 ENCOUNTER — TELEPHONE (OUTPATIENT)
Dept: PRIMARY CARE | Facility: CLINIC | Age: 79
End: 2024-03-21
Payer: COMMERCIAL

## 2024-03-21 DIAGNOSIS — I10 BENIGN HYPERTENSION: ICD-10-CM

## 2024-03-21 RX ORDER — PROPRANOLOL HYDROCHLORIDE 20 MG/1
TABLET ORAL
Qty: 360 TABLET | Refills: 2 | Status: SHIPPED | OUTPATIENT
Start: 2024-03-21

## 2024-04-11 ENCOUNTER — TELEPHONE (OUTPATIENT)
Dept: PRIMARY CARE | Facility: CLINIC | Age: 79
End: 2024-04-11
Payer: COMMERCIAL

## 2024-04-11 DIAGNOSIS — M19.90 OTHER TYPE OF OSTEOARTHRITIS, UNSPECIFIED SITE: ICD-10-CM

## 2024-04-11 RX ORDER — MELOXICAM 15 MG/1
15 TABLET ORAL DAILY
Qty: 90 TABLET | Refills: 0 | Status: SHIPPED | OUTPATIENT
Start: 2024-04-11 | End: 2024-04-23 | Stop reason: SDUPTHER

## 2024-04-23 ENCOUNTER — TELEPHONE (OUTPATIENT)
Dept: PRIMARY CARE | Facility: CLINIC | Age: 79
End: 2024-04-23

## 2024-04-23 DIAGNOSIS — M19.90 OTHER TYPE OF OSTEOARTHRITIS, UNSPECIFIED SITE: ICD-10-CM

## 2024-04-23 RX ORDER — MELOXICAM 15 MG/1
15 TABLET ORAL DAILY
Qty: 14 TABLET | Refills: 0 | Status: SHIPPED | OUTPATIENT
Start: 2024-04-23 | End: 2024-05-07

## 2024-04-23 NOTE — TELEPHONE ENCOUNTER
Pt requesting a short order meloxicam to local pharm as mail order has not arrived yet.  Since it was sent to pharm 4/11/24, pt is thinking it may have gotten lost in the mail.  Pt states she is having a lot of discomfort in her back and really needs this med.    If agreeable, please send meloxicam 15 mg to [address].  LV 2/29/24, NV 9/25/24.    Pt advised she may have to pay OOP for this.

## 2024-04-25 ENCOUNTER — TRANSCRIBE ORDERS (OUTPATIENT)
Dept: ORTHOPEDIC SURGERY | Facility: HOSPITAL | Age: 79
End: 2024-04-25
Payer: COMMERCIAL

## 2024-04-25 DIAGNOSIS — M54.50 LOW BACK PAIN, UNSPECIFIED BACK PAIN LATERALITY, UNSPECIFIED CHRONICITY, UNSPECIFIED WHETHER SCIATICA PRESENT: ICD-10-CM

## 2024-04-26 ENCOUNTER — HOSPITAL ENCOUNTER (OUTPATIENT)
Dept: RADIOLOGY | Facility: CLINIC | Age: 79
Discharge: HOME | End: 2024-04-26
Payer: COMMERCIAL

## 2024-04-26 ENCOUNTER — OFFICE VISIT (OUTPATIENT)
Dept: ORTHOPEDIC SURGERY | Facility: CLINIC | Age: 79
End: 2024-04-26
Payer: COMMERCIAL

## 2024-04-26 VITALS — WEIGHT: 242 LBS | HEIGHT: 63 IN | BODY MASS INDEX: 42.88 KG/M2

## 2024-04-26 DIAGNOSIS — M54.50 LOW BACK PAIN, UNSPECIFIED BACK PAIN LATERALITY, UNSPECIFIED CHRONICITY, UNSPECIFIED WHETHER SCIATICA PRESENT: ICD-10-CM

## 2024-04-26 DIAGNOSIS — M48.061 LUMBAR STENOSIS WITHOUT NEUROGENIC CLAUDICATION: Primary | ICD-10-CM

## 2024-04-26 DIAGNOSIS — M43.10 ACQUIRED SPONDYLOLISTHESIS: ICD-10-CM

## 2024-04-26 PROCEDURE — 1159F MED LIST DOCD IN RCRD: CPT | Performed by: ORTHOPAEDIC SURGERY

## 2024-04-26 PROCEDURE — 72110 X-RAY EXAM L-2 SPINE 4/>VWS: CPT

## 2024-04-26 PROCEDURE — 72110 X-RAY EXAM L-2 SPINE 4/>VWS: CPT | Performed by: RADIOLOGY

## 2024-04-26 PROCEDURE — 99214 OFFICE O/P EST MOD 30 MIN: CPT | Performed by: ORTHOPAEDIC SURGERY

## 2024-04-26 ASSESSMENT — PAIN - FUNCTIONAL ASSESSMENT: PAIN_FUNCTIONAL_ASSESSMENT: 0-10

## 2024-04-26 NOTE — LETTER
April 30, 2024     Cole Wong MD  [address] [place]  [place] [address]    Patient: Maria M Valdivia   YOB: 1945   Date of Visit: 4/26/2024       Dear Dr. Cole Wong MD:    Thank you for referring Maria M Valdivia to me for evaluation. Below are my notes for this consultation.  If you have questions, please do not hesitate to call me. I look forward to following your patient along with you.       Sincerely,     Saulo Funes MD      CC: No Recipients  ______________________________________________________________________________________    HPI:Nuria Valdivia is a 79-year-old woman who comes in today with complaints of back pain and claudication symptoms.  She has been using a cane for ambulation secondary to the discomfort.  She takes anti-inflammatories.  She has not had physical therapy or steroid injections.      ROS:  Reviewed on EMR and patient intake sheet.    PMH/SH:  Reviewed on EMR and patient intake sheet.    Exam:  Physical Exam    Constitutional: Well appearing; no acute distress  Eyes: pupils are equal and round  Psych: normal affect  Respiratory: non-labored breathing  Cardiovascular: regular rate and rhythm  GI: non-distended abdomen  Musculoskeletal: no pain with range of motion of the hips bilaterally  Neurologic: [4]/5 strength in the lower extremities bilaterally]; [negative] straight leg raise    Radiology:     X-rays demonstrate L3-4 spondylolisthesis and multilevel lateral listhesis.  She has a degenerative lumbar scoliosis.    Diagnosis:    Neurogenic claudication secondary spinal stenosis; degenerative spondylolisthesis    Assessment and Plan:   79-year-old woman with claudication symptoms secondary to spinal stenosis and multilevel spondylolisthesis.  At this time I recommended physical therapy for core strengthening and some steroid injections.  We discussed the role of pain management and steroid injections.  This included a brief overview of  the injection procedure itself, the rationale behind this procedure, and the appropriate expectations for treatment of the patient's pain.  A referral to a pain management specialist was offered to the patient.    If her symptoms remain intolerable, then MRI and follow-up would be appropriate.    The patient was in agreement with the plan. At the end of the visit today, the patient felt that all questions had been answered satisfactorily.  The patient was pleased with the visit and very appreciative for the care rendered.     Thank you very much for the kind referral.  It is a privilege, and a pleasure, to partner with you in the care of your patients.  I would be delighted to assist you with any further consultations as needed.          Saulo Funes MD    Chief of Spine Surgery, [facility]  Director of Spine Service, [facility]  , Department of Orthopaedics  [facility] [employer]  [address] [id number]  P: [phone]  [facility].[employer]    This note was dictated with voice recognition software.  It has not been proofread for grammatical errors, typographical mistakes or other semantic inconsistencies.

## 2024-04-30 NOTE — PROGRESS NOTES
HPI:Nuria Valdivia is a 79-year-old woman who comes in today with complaints of back pain and claudication symptoms.  She has been using a cane for ambulation secondary to the discomfort.  She takes anti-inflammatories.  She has not had physical therapy or steroid injections.      ROS:  Reviewed on EMR and patient intake sheet.    PMH/SH:  Reviewed on EMR and patient intake sheet.    Exam:  Physical Exam    Constitutional: Well appearing; no acute distress  Eyes: pupils are equal and round  Psych: normal affect  Respiratory: non-labored breathing  Cardiovascular: regular rate and rhythm  GI: non-distended abdomen  Musculoskeletal: no pain with range of motion of the hips bilaterally  Neurologic: [4]/5 strength in the lower extremities bilaterally]; [negative] straight leg raise    Radiology:     X-rays demonstrate L3-4 spondylolisthesis and multilevel lateral listhesis.  She has a degenerative lumbar scoliosis.    Diagnosis:    Neurogenic claudication secondary spinal stenosis; degenerative spondylolisthesis    Assessment and Plan:   79-year-old woman with claudication symptoms secondary to spinal stenosis and multilevel spondylolisthesis.  At this time I recommended physical therapy for core strengthening and some steroid injections.  We discussed the role of pain management and steroid injections.  This included a brief overview of the injection procedure itself, the rationale behind this procedure, and the appropriate expectations for treatment of the patient's pain.  A referral to a pain management specialist was offered to the patient.    If her symptoms remain intolerable, then MRI and follow-up would be appropriate.    The patient was in agreement with the plan. At the end of the visit today, the patient felt that all questions had been answered satisfactorily.  The patient was pleased with the visit and very appreciative for the care rendered.     Thank you very much for the kind referral.  It is a  privilege, and a pleasure, to partner with you in the care of your patients.  I would be delighted to assist you with any further consultations as needed.          Saulo Funes MD    Chief of Spine Surgery, [facility]  Director of Spine Service, [facility]  , Department of Orthopaedics  [facility] [employer]  [address] [place] [id number]  P: [phone]  [facility]    This note was dictated with voice recognition software.  It has not been proofread for grammatical errors, typographical mistakes or other semantic inconsistencies.   The resident's documentation has been prepared under my direction and personally reviewed by me in its entirety. I confirm that the note above accurately reflects all work, treatment, procedures, and medical decision making performed by me. See MAGI Johnson attending.

## 2024-05-03 DIAGNOSIS — K21.9 GASTROESOPHAGEAL REFLUX DISEASE, UNSPECIFIED WHETHER ESOPHAGITIS PRESENT: ICD-10-CM

## 2024-05-03 RX ORDER — OMEPRAZOLE 20 MG/1
20 CAPSULE, DELAYED RELEASE ORAL DAILY
Qty: 90 CAPSULE | Refills: 3 | Status: SHIPPED | OUTPATIENT
Start: 2024-05-03

## 2024-05-04 DIAGNOSIS — F41.8 MIXED ANXIETY AND DEPRESSIVE DISORDER: ICD-10-CM

## 2024-05-04 DIAGNOSIS — I10 BENIGN HYPERTENSION: ICD-10-CM

## 2024-05-06 RX ORDER — DULOXETIN HYDROCHLORIDE 30 MG/1
30 CAPSULE, DELAYED RELEASE ORAL DAILY
Qty: 90 CAPSULE | Refills: 2 | Status: SHIPPED | OUTPATIENT
Start: 2024-05-06 | End: 2024-05-09 | Stop reason: SDUPTHER

## 2024-05-06 RX ORDER — LOSARTAN POTASSIUM 50 MG/1
TABLET ORAL
Qty: 90 TABLET | Refills: 2 | Status: SHIPPED | OUTPATIENT
Start: 2024-05-06

## 2024-05-09 ENCOUNTER — OFFICE VISIT (OUTPATIENT)
Dept: PAIN MEDICINE | Facility: CLINIC | Age: 79
End: 2024-05-09
Payer: COMMERCIAL

## 2024-05-09 VITALS
RESPIRATION RATE: 20 BRPM | WEIGHT: 242 LBS | HEART RATE: 85 BPM | OXYGEN SATURATION: 97 % | BODY MASS INDEX: 42.88 KG/M2 | SYSTOLIC BLOOD PRESSURE: 120 MMHG | DIASTOLIC BLOOD PRESSURE: 80 MMHG | HEIGHT: 63 IN

## 2024-05-09 DIAGNOSIS — M51.36 DDD (DEGENERATIVE DISC DISEASE), LUMBAR: ICD-10-CM

## 2024-05-09 DIAGNOSIS — M54.16 LUMBAR RADICULOPATHY: ICD-10-CM

## 2024-05-09 DIAGNOSIS — M48.062 SPINAL STENOSIS OF LUMBAR REGION WITH NEUROGENIC CLAUDICATION: Primary | ICD-10-CM

## 2024-05-09 DIAGNOSIS — M47.816 LUMBAR SPONDYLOSIS: ICD-10-CM

## 2024-05-09 DIAGNOSIS — M79.18 MYOFASCIAL PAIN: ICD-10-CM

## 2024-05-09 PROCEDURE — 99204 OFFICE O/P NEW MOD 45 MIN: CPT | Performed by: STUDENT IN AN ORGANIZED HEALTH CARE EDUCATION/TRAINING PROGRAM

## 2024-05-09 PROCEDURE — 1160F RVW MEDS BY RX/DR IN RCRD: CPT | Performed by: STUDENT IN AN ORGANIZED HEALTH CARE EDUCATION/TRAINING PROGRAM

## 2024-05-09 PROCEDURE — 1159F MED LIST DOCD IN RCRD: CPT | Performed by: STUDENT IN AN ORGANIZED HEALTH CARE EDUCATION/TRAINING PROGRAM

## 2024-05-09 PROCEDURE — 1125F AMNT PAIN NOTED PAIN PRSNT: CPT | Performed by: STUDENT IN AN ORGANIZED HEALTH CARE EDUCATION/TRAINING PROGRAM

## 2024-05-09 PROCEDURE — 3079F DIAST BP 80-89 MM HG: CPT | Performed by: STUDENT IN AN ORGANIZED HEALTH CARE EDUCATION/TRAINING PROGRAM

## 2024-05-09 PROCEDURE — 1036F TOBACCO NON-USER: CPT | Performed by: STUDENT IN AN ORGANIZED HEALTH CARE EDUCATION/TRAINING PROGRAM

## 2024-05-09 PROCEDURE — 99214 OFFICE O/P EST MOD 30 MIN: CPT | Performed by: STUDENT IN AN ORGANIZED HEALTH CARE EDUCATION/TRAINING PROGRAM

## 2024-05-09 PROCEDURE — 3074F SYST BP LT 130 MM HG: CPT | Performed by: STUDENT IN AN ORGANIZED HEALTH CARE EDUCATION/TRAINING PROGRAM

## 2024-05-09 RX ORDER — DULOXETIN HYDROCHLORIDE 60 MG/1
60 CAPSULE, DELAYED RELEASE ORAL DAILY
Qty: 90 CAPSULE | Refills: 1 | Status: SHIPPED | OUTPATIENT
Start: 2024-05-09 | End: 2024-11-05

## 2024-05-09 ASSESSMENT — PAIN - FUNCTIONAL ASSESSMENT: PAIN_FUNCTIONAL_ASSESSMENT: 0-10

## 2024-05-09 ASSESSMENT — PAIN SCALES - GENERAL
PAINLEVEL: 8
PAINLEVEL_OUTOF10: 8

## 2024-05-09 ASSESSMENT — PATIENT HEALTH QUESTIONNAIRE - PHQ9
SUM OF ALL RESPONSES TO PHQ9 QUESTIONS 1 AND 2: 0
1. LITTLE INTEREST OR PLEASURE IN DOING THINGS: NOT AT ALL
2. FEELING DOWN, DEPRESSED OR HOPELESS: NOT AT ALL

## 2024-05-09 ASSESSMENT — PAIN DESCRIPTION - DESCRIPTORS: DESCRIPTORS: ACHING

## 2024-05-09 ASSESSMENT — LIFESTYLE VARIABLES: TOTAL SCORE: 0

## 2024-05-09 NOTE — PROGRESS NOTES
[facility] Pain Management  New Patient Office Visit Note 2024    Patient Information: Nuria Valdivia, MRN: [id number], : 1945   Primary Care/Referring Physician: Cole Wong MD, [address] [place] [address] / [place] [address]     Chief Complaint: Low back and right leg pain  History of Pain: Ms. Nuria Valdivia is a 79 y.o. female with a PMHx of HTN, HLD, GERD, anxiety, depression who presents for low back and right leg pain.    She reports onset of pain approximately 3 months ago. She describes a constant back ache and has difficulty with prolonged standing. She walks very slowly and has to use a cane. She reports reports radiation of pain down the back of her right leg. She has some numbness around her right knee but relates this to her prior right knee revision surgery. She denies any cramping or feelings of heaviness in her legs. She has minimal pain with sitting. She can walk further with using a shopping cart. She has not started PT yet, as she was thinking she might get an injection today and then could start the therapy.    Current Pain Medications: Meloxicam 15 mg daily, Duloxetine 30 mg daily, Gabapentin 100 mg BID  Previously Tried Pain Medications: Lidocaine patches - no benefit    Relevant Surgeries: History of bilateral TSA, right knee TKA. She does report some type of low back surgery in the distant past (details unclear)  Injections: Denies any lumbar spine injections  Physical/Occupational Therapy: She was referred to PT but hasn't started this yet    Medications:   Current Outpatient Medications   Medication Instructions    acetaminophen (TylenoL) 325 mg tablet 2 tablets as needed Orally every 6 hrs    calcium citrate-vitamin D3 315 mg-6.25 mcg (250 unit) tablet 2 tab(s) orally once a day    cholecalciferol (Vitamin D-3) 50 MCG (2000 UT) tablet 1 tablet, oral, Daily    cyanocobalamin (Vitamin B-12) 1,000 mcg tablet 1 tablet, oral, Weekly, weekly    docusate sodium (COLACE)  100 mg, oral, 2 times daily PRN    DULoxetine (CYMBALTA) 60 mg, oral, Daily    gabapentin (NEURONTIN) 100 mg, oral, 2 times daily    glucosam/chond/hyalu/CF borate (MOVE FREE JOINT HEALTH ORAL) 1 tablet, oral, Daily    L. acidophilus/Bifid. animalis 15.5 billion cell capsule 1 cap(s) orally once a day    loratadine (Claritin) 10 mg tablet      losartan (Cozaar) 50 mg tablet TAKE 1 TABLET ONCE DAILY    magnesium oxide 500 mg, oral, Daily, Take with food.    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 09/15/2023    Benign hypertension 09/15/2023    Essential tremor 09/15/2023    Fatty liver 09/15/2023    High cholesterol 09/15/2023    History of arthroplasty of right shoulder 09/15/2023    History of hypertension 02/12/2024    Hypercholesterolemia 09/15/2023    Mixed anxiety depressive disorder 09/15/2023    Pain due to knee joint prosthesis ([facility]) 09/15/2023    Pain of left shoulder region 02/12/2024    Personal history of other diseases of the circulatory system     History of hypertension    Personal history of other diseases of the musculoskeletal system and connective tissue     Personal history of arthritis    Personal history of other diseases of the musculoskeletal system and connective tissue     Personal history of spinal stenosis    Primary localized osteoarthrosis 11/12/2008    Rotator cuff arthropathy of right shoulder 09/15/2023    Spinal stenosis of lumbar region 09/15/2023    Steatosis of  liver 09/15/2023    Unspecified menopausal and perimenopausal disorder 09/15/2023    Vitamin B12 deficiency without anemia 09/15/2023      Past Surgical History:   Procedure Laterality Date    BI MAMMO GUIDED LOCALIZATION BREAST RIGHT Right 10/28/2013    BI MAMMO GUIDED LOCALIZATION BREAST RIGHT [facility]    BREAST BIOPSY Left     Excisional Bx 2005 - Benign    BREAST BIOPSY Right     Excisional Bx 2013 - Benign    BREAST BIOPSY Right     Stereotactic Bx 2013 - [facility]    TONSILLECTOMY  04/30/2014    Tonsillectomy       Family History   Problem Relation Name Age of Onset    Heart failure Mother      Restless legs syndrome Mother      Heart disease Mother      Diabetes Mother      Tremor Mother          essential    Testicular cancer Father  62    Heart disease Father      Lupus Sister      Pulmonary embolism Sister      Tremor Brother          essential     Social History     Socioeconomic History    Marital status:      Spouse name: Not on file    Number of children: Not on file    Years of education: Not on file    Highest education level: Not on file   Occupational History    Not on file   Tobacco Use    Smoking status: Never     Passive exposure: Never    Smokeless tobacco: Never   Vaping Use    Vaping status: Never Used   Substance and Sexual Activity    Alcohol use: Yes    Drug use: Never    Sexual activity: Not on file   Other Topics Concern    Not on file   Social History Narrative    Not on file     Social Determinants of Health     Financial Resource Strain: Not on file   Food Insecurity: Not on file   Transportation Needs: Not on file   Physical Activity: Not on file   Stress: Not on file   Social Connections: Not on file   Intimate Partner Violence: Not on file   Housing Stability: Not on file       Problems, Past medical history, past surgical history, Medications, allergies, social and family history reviewed and as per the electronic medical record from today's encounter    Review of  "Systems:  CONST: No fever, chills, fatigue, weight changes  EYES: No loss of vision  ENT: No hearing loss, tinnitus  CV: No chest pain, palpitations  RESP: No dyspnea, shortness of breath, cough  GI: No stool incontinence, nausea, vomiting  : No urinary incontinence  MSK: No joint swelling  SKIN: No rash, no hives  NEURO: No headache, dizziness  PSYCH: Hx of anxiety, depression  HEM/LYMPH: No easy bruising or bleeding  All other systems reviewed are negative     Physical Exam:  Vitals: /80   Pulse 85   Resp 20   Ht 1.6 m (5' 3\")   Wt 110 kg (242 lb)   SpO2 97%   BMI 42.87 kg/m²   General: No apparent distress. Alert, appropriate, oriented x 3. Mood and affect normal. Speaking in full sentences.  HENT: Normocephalic, atraumatic. Hearing intact.  Eyes: Extraocular movements grossly intact. Pupils equal and round.   Neck: Supple, trachea midline.  Lungs: Symmetric respiratory excursion on visual exam, nonlabored breathing.  Extremities: No clubbing, cyanosis noted in arms or legs.  Skin: No rashes, lesions, alopecia noted on back or extremities.   Back: Reports pain with extension. No tenderness over the spinous processes. Reports some pain with palpation of bilateral lumbar paraspinal and gluteal muscles. No spasm noted over musculature. No misalignment or asymmetry noted.  Neuro: Alert and appropriate. Motor strength 4+/5 throughout bilateral lower extremities. Sensory intact to light touch bilateral lower extremities. Using a cane to ambulate. Bulk and tone within normal limits.    Laboratory Data:  The following laboratory data were reviewed during this visit:   Lab Results   Component Value Date    WBC 7.8 01/08/2024    RBC 5.02 01/08/2024    HGB 14.7 01/08/2024    HCT 45.7 01/08/2024     01/08/2024      No results found for: \"INR\"  Lab Results   Component Value Date    CREATININE 0.90 01/08/2024    HGBA1C 5.1 06/10/2022       Imaging:  The following imaging impressions were reviewed by me " during this visit:    -4/26/24 lumbar spine xray shows levocurvature of the lumbar spine. There is severe multilevel disc space narrowing with osteophytosis closed throughout the lumbar spine worse at L4-L5 and L5-S1. The degree of levocurvature limits evaluation however there is suggestion of anterolisthesis of L3 on L4. There is severe facet disease in the lower lumbar spine as well the no definite fracture seen    I also personally reviewed the images from the above studies myself. These images and my interpretation of them contributed to the management and decision making of the patient's medical plan.    ASSESSMENT:  Ms. Nuria Valdivia is a 79 y.o. female with low back and right leg pain that is consistent with:    1. Spinal stenosis of lumbar region with neurogenic claudication    2. Lumbar radiculopathy    3. Lumbar spondylosis    4. DDD (degenerative disc disease), lumbar    5. Myofascial pain        PLAN:    Diagnostics:   - I reviewed her lumbar spine xray, which shows severe multi-level degenerative changes. No further diagnostics are indicated at this time but I would consider a lumbar spine MRI if her pain does not improve with additional conservative measures.    Physical Therapy and Rehabilitation:     - I again discussed that she should start physical therapy prior to considering advanced imaging or intervention    Psychologically:  - No needs at this time    Medications  - Recommend increasing Duloxetine to 60 mg daily.    Duration  - 3 months    Interventions:  - Her pain is likely multi-factorial secondary to lumbar spinal stenosis with neurogenic claudication, lumbar radiculopathy, lumbar spondylosis, and myofascial pain. Given her severe, multi-level degenerative changes, scoliosis, and reported lumbar spine surgery in the past, I would consider lumbar SHARAD in the future, but would want to get a lumbar spine MRI for interventional planning and safety purposes prior to pursuing  this        Sincerely,  Remberto Mclaughlin MD  [facility] - [place]

## 2024-05-15 ENCOUNTER — EVALUATION (OUTPATIENT)
Dept: PHYSICAL THERAPY | Facility: CLINIC | Age: 79
End: 2024-05-15
Payer: COMMERCIAL

## 2024-05-15 DIAGNOSIS — G89.29 CHRONIC LOW BACK PAIN, UNSPECIFIED BACK PAIN LATERALITY, UNSPECIFIED WHETHER SCIATICA PRESENT: Primary | ICD-10-CM

## 2024-05-15 DIAGNOSIS — M48.061 LUMBAR STENOSIS WITHOUT NEUROGENIC CLAUDICATION: ICD-10-CM

## 2024-05-15 DIAGNOSIS — M54.50 CHRONIC LOW BACK PAIN, UNSPECIFIED BACK PAIN LATERALITY, UNSPECIFIED WHETHER SCIATICA PRESENT: Primary | ICD-10-CM

## 2024-05-15 PROCEDURE — 97162 PT EVAL MOD COMPLEX 30 MIN: CPT | Mod: GP | Performed by: PHYSICAL THERAPIST

## 2024-05-15 ASSESSMENT — ENCOUNTER SYMPTOMS
LOSS OF SENSATION IN FEET: 0
OCCASIONAL FEELINGS OF UNSTEADINESS: 1
DEPRESSION: 0

## 2024-05-15 ASSESSMENT — PAIN SCALES - GENERAL: PAINLEVEL_OUTOF10: 5 - MODERATE PAIN

## 2024-05-15 ASSESSMENT — PAIN DESCRIPTION - DESCRIPTORS: DESCRIPTORS: SHARP

## 2024-05-15 ASSESSMENT — PAIN - FUNCTIONAL ASSESSMENT: PAIN_FUNCTIONAL_ASSESSMENT: 0-10

## 2024-05-15 NOTE — PROGRESS NOTES
"Physical Therapy Evaluation and Treatment      Patient Name: Nuria Valdivia \"Maria M\"  MRN: [id number]  Today's Date: 5/15/2024  Time Calculation  Start Time: 1320  Stop Time: 1350  Time Calculation (min): 30 min  Moderate complexity due to patient's clinical presentation being evolving with changing characteristics, with comorbidities/complexities to include obesity, chronic pain, multiple surgeries, all of which may negatively impact rehab tolerance and progression.    Insurance:  Visit number: 1 of 7  Authorization info: NO AUTH / [place] / $40 COPAY   Insurance Type: Payor: [employer] / Plan: [employer] / Product Type: *No Product type* /     Current Problem:   1. Chronic low back pain, unspecified back pain laterality, unspecified whether sciatica present  Follow Up In Physical Therapy      2. Lumbar stenosis without neurogenic claudication  Referral to Physical Therapy          Subjective    General:  General  Reason for Referral: LBP  Referred By: Dr. Funes  General Comment: Pt presents with back pain that is chronic in nature. She has had bilateral knee placements and complications as well as shoulder problems so she didn't take the time to deal with her back. she is unable to get steroid injections until she gets PT. She reports pain when she is on her feet, standing and walking. She does have a history of falls with the last one a year ago.She has to take breaks after 10 minutes.  Precautions:  Precautions  Precautions Comment: Fall history  Pain:  Pain Assessment  Pain Assessment: 0-10  Pain Score: 5 - Moderate pain  Pain Type: Chronic pain  Pain Location: Back  Pain Orientation: Lower  Pain Radiating Towards: R leg  Pain Descriptors: Sharp  Home Living:   Lives with family, 1st floor set up, basement with bilateral rails   Prior Level of Function:  Prior Function Per Pt/Caregiver Report  Level of [place]: Independent with ADLs and functional transfers, Independent with " homemaking with ambulation    Objective   General Assessments:  Range of Motion Comments: Lumbar ROM within limits of arthritic changes; B hip mobiltiy within functional limits    Strength Comments: Weakness R hip grossly 4/5 throughout; L hip grossly 4+/5; B knee 5/5  Functional Assessments:  Gait Comment: reduced adela with trendelenburg gait of R hip  , Stairs Comment: Not performed this date  , and Transfers Comment: Required B UE A during sit <> stand    Outcome Measures:  Other Measures  Oswestry Disablity Index (RACHEL): 29     Treatments:  Therapeutic Exercise  Therapeutic Exercise Performed: Yes  Therapeutic Exercise Activity 1: Access Code [email or web address]  - Seated Hamstring Stretch  - 1 x daily - 7 x weekly - 1 sets - 3 reps - 30 seconds  hold  - Seated Flexion Stretch  - 1 x daily - 7 x weekly - 3 sets - 5 reps - 15 seconds hold  - Seated Single Knee to Chest  - 1 x daily - 7 x weekly - 3 sets - 5 reps - 10 seconds hold  - Seated Transversus Abdominis Bracing  - 1 x daily - 7 x weekly - 3 sets - 10 reps  Therapeutic Activity  Therapeutic Activity Performed: Yes  Therapeutic Activity 1: Educated on anatomy in relation to findings as well as flexion bias to reduce tension      Assessment   Assessment:  PT Assessment Results: Decreased strength, Decreased range of motion, Decreased endurance, Impaired balance, Pain  Rehab Prognosis: Fair  Evaluation/Treatment Tolerance: Patient limited by pain  Strengths: Attitude of self, Housing layout, Rehab experience, Support of Caregivers  Barriers to Participation: Premorbid level of function  Assessment Comment: Pt is a 79 y.o female presenting to therapy with chronic LBP referring into R hip. Pt demonstrated normal limits of lumbar mobility given arthritic changes of spine. Weakness evident throughout R LE compared to L in seated MMT position as well as functional observation with L hip drop during R stance. Pt unable to perform prolonged standing or ambulation further  suggestive of arthritic spinal changes creating described deficits. At this time pt owuld benefit from skilled physical therapy in order reduce pain, improve ambulation and standing and prevent further functional decline.    Plan:  Treatment/Interventions: Dry needling, Education/ Instruction, Cryotherapy, Gait training, Hot pack, Manual therapy, Neuromuscular re-education, Taping techniques, Therapeutic activities, Therapeutic exercises  PT Plan: Skilled PT  PT Frequency: 1 time per week  Duration: 6 weeks + eval  Onset Date: 05/15/24  Certification Period Start Date: 05/15/24  Certification Period End Date: 08/13/24  Number of Treatments Authorized: [place]  Rehab Potential: Fair  Plan of Care Agreement: Patient    OP EDUCATION:  Outpatient Education  Individual(s) Educated: Patient  Education Provided: Anatomy, Home Exercise Program, POC  Risk and Benefits Discussed with Patient/Caregiver/Other: yes  Patient/Caregiver Demonstrated Understanding: yes  Plan of Care Discussed and Agreed Upon: yes  Patient Response to Education: Patient/Caregiver Verbalized Understanding of Information, Patient/Caregiver Performed Return Demonstration of Exercises/Activities, Patient/Caregiver Asked Appropriate Questions    Goals:  Active       PT Problem       PT Goal 1       Start:  05/15/24    Expected End:  08/03/24       Pt will be 100% IND with HEP in 6 weeks in order to maintain progress with therapy.           PT Goal 2       Start:  05/15/24    Expected End:  08/03/24       Pt will reduce pain levels to no more than 2/10 in 6 weeks in order to improve sleep, ambulation, standing for cooking/cleaning and dressing.          PT Goal 3       Start:  05/15/24    Expected End:  08/03/24       Pt will be able to perform prolonged standing and ambulation for 20 min in 6 weeks required for cooking, cleaning and ambulation.          PT Goal 4       Start:  05/15/24    Expected End:  08/03/24       Pt will be able to perform community  ambulation demonstrating reduced trendelenburg without pain increase in 6 weeks for home and community ambulation needs.          PT Goal 5       Start:  05/15/24    Expected End:  08/03/24       Pt will demonstrate subjective improvement of ADLs and recreational activities through improved score of 12 on RACHEL in 6 weeks.

## 2024-06-05 ENCOUNTER — TREATMENT (OUTPATIENT)
Dept: PHYSICAL THERAPY | Facility: CLINIC | Age: 79
End: 2024-06-05
Payer: COMMERCIAL

## 2024-06-05 DIAGNOSIS — M54.50 CHRONIC LOW BACK PAIN, UNSPECIFIED BACK PAIN LATERALITY, UNSPECIFIED WHETHER SCIATICA PRESENT: ICD-10-CM

## 2024-06-05 DIAGNOSIS — G89.29 CHRONIC LOW BACK PAIN, UNSPECIFIED BACK PAIN LATERALITY, UNSPECIFIED WHETHER SCIATICA PRESENT: ICD-10-CM

## 2024-06-05 PROCEDURE — 97110 THERAPEUTIC EXERCISES: CPT | Mod: GP,CQ

## 2024-06-05 ASSESSMENT — PAIN SCALES - GENERAL: PAINLEVEL_OUTOF10: 3

## 2024-06-05 NOTE — PROGRESS NOTES
"Physical Therapy Treatment    Patient Name: Nuria Valdivia  MRN: [id number]  Today's Date: 6/5/2024  Time Calculation  Start Time: 1432  Stop Time: 1517  Time Calculation (min): 45 min  PT Therapeutic Procedures Time Entry  Therapeutic Exercise Time Entry: 41    Insurance:  Visit number: 2 of 7  Authorization info: DEVOTED [employer]ORE - NO AUTH / [place]ISITS / $40 COPAY / OOP $3800 - $50 met / 5/14/24  Insurance Type: Payor: [employer] / Plan: [employer] / Product Type: *No Product type* /     Current Problem   1. Chronic low back pain, unspecified back pain laterality, unspecified whether sciatica present  Follow Up In Physical Therapy          Subjective   Pt reports decreased pain level on arrival this visit.    General   Reason for Referral: LBP  Referred By: Dr. Funes  Precautions:  Precautions  Precautions Comment: Fall history  Pain   Pain Score: 3  Pain Type: Chronic pain  Pain Location: Back  Pain Orientation: Lower  Pain Radiating Towards: R hip  Pain Frequency: Constant/continuous  Post Treatment Pain Level 3/10    Objective   Pt requires occasional cueing for core contractions during ther ex.    Treatments:  Therapeutic Exercise:  Therapeutic Exercise  Therapeutic Exercise Performed: Yes  Therapeutic Exercise Activity 1: Nustep L4, 6'  Therapeutic Exercise Activity 2: Seated hamstring stretch 30\"x3 L/R each  Therapeutic Exercise Activity 3: Seated with lumbar support 4'  Therapeutic Exercise Activity 4: Isometric abdominal brace 2x10  Therapeutic Exercise Activity 5: Isometric abdominal brace with pelvic floor 2x10  Therapeutic Exercise Activity 6: Isometric abdominal brace with hip adduction 2x10  Therapeutic Exercise Activity 7: Isometric abdominal brace with hp abduction BTB  2x10  Therapeutic Exercise Activity 8: Isometric abdominal brace with shallow MIP 2x10 L/R each  Therapeutic Exercise Activity 9: Seated flexion stretch  Therapeutic Exercise Activity 10: Discussed use and " importance of core musculature in protection of spine       Assessment   Assessment:   PT Assessment  PT Assessment Results: Decreased strength, Decreased range of motion, Decreased endurance, Impaired balance, Pain  Rehab Prognosis: Fair  Barriers to Discharge: Chronic nature of current issue  Strengths: Attitude of self, Housing layout, Rehab experience, Support of Caregivers  Barriers to Participation: Premorbid level of function  Assessment Comment: Pt tolerates ther ex progressions with transient increases in S/S, deomstrates fair core control during hooklying activites.    Plan:   OP PT Plan  Treatment/Interventions: Dry needling, Education/ Instruction, Cryotherapy, Gait training, Hot pack, Manual therapy, Neuromuscular re-education, Taping techniques, Therapeutic activities, Therapeutic exercises  PT Plan: Skilled PT  PT Frequency: 1 time per week  Duration: 6 weeks + eval  Onset Date: 05/15/24  Certification Period Start Date: 05/15/24  Certification Period End Date: 08/13/24  Number of Treatments Authorized: MN  Rehab Potential: Fair  Plan of Care Agreement: Patient    OP EDUCATION:  Outpatient Education  Individual(s) Educated: Patient  Education Provided: Body Mechanics, Home Exercise Program  Patient/Caregiver Demonstrated Understanding: yes  Patient Response to Education: Patient/Caregiver Verbalized Understanding of Information, Patient/Caregiver Performed Return Demonstration of Exercises/Activities, Patient/Caregiver Asked Appropriate Questions    Goals:   Active       PT Problem       PT Goal 1       Start:  05/15/24    Expected End:  08/03/24       Pt will be 100% IND with HEP in 6 weeks in order to maintain progress with therapy.           PT Goal 2       Start:  05/15/24    Expected End:  08/03/24       Pt will reduce pain levels to no more than 2/10 in 6 weeks in order to improve sleep, ambulation, standing for cooking/cleaning and dressing.          PT Goal 3       Start:  05/15/24    Expected  End:  08/03/24       Pt will be able to perform prolonged standing and ambulation for 20 min in 6 weeks required for cooking, cleaning and ambulation.          PT Goal 4       Start:  05/15/24    Expected End:  08/03/24       Pt will be able to perform community ambulation demonstrating reduced trendelenburg without pain increase in 6 weeks for home and community ambulation needs.          PT Goal 5       Start:  05/15/24    Expected End:  08/03/24       Pt will demonstrate subjective improvement of ADLs and recreational activities through improved score of 12 on RACHEL in 6 weeks.

## 2024-06-12 ENCOUNTER — TREATMENT (OUTPATIENT)
Dept: PHYSICAL THERAPY | Facility: CLINIC | Age: 79
End: 2024-06-12
Payer: COMMERCIAL

## 2024-06-12 DIAGNOSIS — M54.50 CHRONIC LOW BACK PAIN, UNSPECIFIED BACK PAIN LATERALITY, UNSPECIFIED WHETHER SCIATICA PRESENT: ICD-10-CM

## 2024-06-12 DIAGNOSIS — G89.29 CHRONIC LOW BACK PAIN, UNSPECIFIED BACK PAIN LATERALITY, UNSPECIFIED WHETHER SCIATICA PRESENT: ICD-10-CM

## 2024-06-12 PROCEDURE — 97110 THERAPEUTIC EXERCISES: CPT | Mod: GP,CQ

## 2024-06-12 ASSESSMENT — PAIN SCALES - GENERAL: PAINLEVEL_OUTOF10: 1

## 2024-06-12 NOTE — PROGRESS NOTES
"Physical Therapy Treatment    Patient Name: Nuria Valdivia  MRN: [id number]  Today's Date: 6/12/2024  Time Calculation  Start Time: 1257  Stop Time: 1344  Time Calculation (min): 47 min  PT Therapeutic Procedures Time Entry  Therapeutic Exercise Time Entry: 43    Insurance:  Visit number: 3 of 7  Authorization info: DEVOTED CORE - NO AUTH / [place] VISITS / $40 COPAY / OOP $3800 - $50 met / 5/14/24  Insurance Type: Payor: [employer] / Plan: [employer] / Product Type: *No Product type* /     Current Problem   1. Chronic low back pain, unspecified back pain laterality, unspecified whether sciatica present  Follow Up In Physical Therapy          Subjective   Pt reports very mild S/S on arrival today.    General   Reason for Referral: LBP  Referred By: Dr. Funes  Precautions:  Precautions  Precautions Comment: Fall history  Pain   Pain Score: 1  Pain Type: Chronic pain  Pain Location: Back  Pain Orientation: Lower  Pain Frequency: Constant/continuous  Post Treatment Pain Level 0-1/10    Objective   Performed seated core activities this visit, decreased cues for core contractions this visit.    Treatments:  Therapeutic Exercise:  Therapeutic Exercise  Therapeutic Exercise Performed: Yes  Therapeutic Exercise Activity 1: Nustep L4, 6'  Therapeutic Exercise Activity 2: Seated hamstring stretch 30\"x3 L/R each  Therapeutic Exercise Activity 3: Seated with lumbar support 4'  Therapeutic Exercise Activity 4: Isometric abdominal brace 2x10  Therapeutic Exercise Activity 5: Isometric abdominal brace with pelvic floor 2x10  Therapeutic Exercise Activity 6: Isometric abdominal brace with hip adduction 2x10  Therapeutic Exercise Activity 7: Isometric abdominal brace with hp abduction BTB  2x10  Therapeutic Exercise Activity 8: Isometric abdominal brace with shallow MIP 2x10 L/R each  Therapeutic Exercise Activity 9: Seated flexion stretch  Therapeutic Exercise Activity 10: Standing PF/DF 2x10 each  Therapeutic " Exercise Activity 11: Minisquats 2x10     Assessment   Assessment:   PT Assessment  PT Assessment Results: Decreased strength, Decreased range of motion, Decreased endurance, Impaired balance, Pain  Rehab Prognosis: Fair  Barriers to Discharge: Chronic nature of current issue  Strengths: Attitude of self, Housing layout, Rehab experience, Support of Caregivers  Barriers to Participation: Premorbid level of function  Assessment Comment: Pt tolerates ther ex without increased S/S thisw visit,  Pt performing HEP regularly at home, has fewer S/S during ADLs since last visit.    Plan:   OP PT Plan  Treatment/Interventions: Dry needling, Education/ Instruction, Cryotherapy, Gait training, Hot pack, Manual therapy, Neuromuscular re-education, Taping techniques, Therapeutic activities, Therapeutic exercises  PT Plan: Skilled PT  PT Frequency: 1 time per week  Duration: 6 weeks + eval  Onset Date: 05/15/24  Certification Period Start Date: 05/15/24  Certification Period End Date: 08/13/24  Number of Treatments Authorized: [place]  Rehab Potential: Fair  Plan of Care Agreement: Patient    OP EDUCATION:  Outpatient Education  Individual(s) Educated: Patient  Education Provided: Body Mechanics  Patient/Caregiver Demonstrated Understanding: yes  Patient Response to Education: Patient/Caregiver Verbalized Understanding of Information, Patient/Caregiver Performed Return Demonstration of Exercises/Activities, Patient/Caregiver Asked Appropriate Questions    Goals:   Active       PT Problem       PT Goal 1       Start:  05/15/24    Expected End:  08/03/24       Pt will be 100% IND with HEP in 6 weeks in order to maintain progress with therapy.           PT Goal 2       Start:  05/15/24    Expected End:  08/03/24       Pt will reduce pain levels to no more than 2/10 in 6 weeks in order to improve sleep, ambulation, standing for cooking/cleaning and dressing.          PT Goal 3       Start:  05/15/24    Expected End:  08/03/24       Pt  will be able to perform prolonged standing and ambulation for 20 min in 6 weeks required for cooking, cleaning and ambulation.          PT Goal 4       Start:  05/15/24    Expected End:  08/03/24       Pt will be able to perform community ambulation demonstrating reduced trendelenburg without pain increase in 6 weeks for home and community ambulation needs.          PT Goal 5       Start:  05/15/24    Expected End:  08/03/24       Pt will demonstrate subjective improvement of ADLs and recreational activities through improved score of 12 on RACHEL in 6 weeks.

## 2024-06-19 ENCOUNTER — APPOINTMENT (OUTPATIENT)
Dept: PHYSICAL THERAPY | Facility: CLINIC | Age: 79
End: 2024-06-19
Payer: COMMERCIAL

## 2024-06-20 ENCOUNTER — APPOINTMENT (OUTPATIENT)
Dept: PAIN MEDICINE | Facility: CLINIC | Age: 79
End: 2024-06-20
Payer: COMMERCIAL

## 2024-06-26 ENCOUNTER — TREATMENT (OUTPATIENT)
Dept: PHYSICAL THERAPY | Facility: CLINIC | Age: 79
End: 2024-06-26
Payer: COMMERCIAL

## 2024-06-26 DIAGNOSIS — G89.29 CHRONIC LOW BACK PAIN, UNSPECIFIED BACK PAIN LATERALITY, UNSPECIFIED WHETHER SCIATICA PRESENT: ICD-10-CM

## 2024-06-26 DIAGNOSIS — M54.50 CHRONIC LOW BACK PAIN, UNSPECIFIED BACK PAIN LATERALITY, UNSPECIFIED WHETHER SCIATICA PRESENT: ICD-10-CM

## 2024-06-26 PROCEDURE — 97110 THERAPEUTIC EXERCISES: CPT | Mod: GP | Performed by: PHYSICAL THERAPIST

## 2024-06-26 ASSESSMENT — PAIN SCALES - GENERAL: PAINLEVEL_OUTOF10: 1

## 2024-06-26 ASSESSMENT — PAIN - FUNCTIONAL ASSESSMENT: PAIN_FUNCTIONAL_ASSESSMENT: 0-10

## 2024-06-26 NOTE — PROGRESS NOTES
"Physical Therapy Treatment    Patient Name: Nuria Valdivia  MRN: [id number]  Today's Date: 6/26/2024  Time Calculation  Start Time: 1403  Stop Time: 1445  Time Calculation (min): 42 min  PT Therapeutic Procedures Time Entry  Therapeutic Exercise Time Entry: 42    Insurance:  Visit number: 4 of 7  Authorization info: NO AUTH / [place]ISITS / $40 COPAY   Insurance Type: Payor: [employer] / Plan: [employer] / Product Type: *No Product type* /     Current Problem   1. Chronic low back pain, unspecified back pain laterality, unspecified whether sciatica present  Follow Up In Physical Therapy          Subjective   General   General Comment: Pt reports that pain is usually worse in the morning and after sitting for too long. Tries to stay active when watching her granddaughter. Pain will radiate down into R buttocks area.  Precautions:  Precautions  Precautions Comment: Fall history  Pain   Pain Assessment: 0-10  0-10 (Numeric) Pain Score: 1  Post Treatment Pain Level 1    Objective   Pulling in back sensation with standing activities    Treatments:  Therapeutic Exercise:  Therapeutic Exercise  Therapeutic Exercise Performed: Yes  Therapeutic Exercise Activity 1: Nustep L4, 6'  Therapeutic Exercise Activity 2: Seated hamstring stretch 30\"x3 L/R each  Therapeutic Exercise Activity 3: seated YSB flexion stretch 3x30\"  Therapeutic Exercise Activity 4: standing toe and heel rises 3x10  Therapeutic Exercise Activity 5: standing hip ABD 3x10  Therapeutic Exercise Activity 6: seated marches 2x10 BL      Assessment   Assessment:   PT Assessment  Assessment Comment: Pt tolerated session well. Was able to complete all assigned activities without an increase in pain however notes a pulling sensation in the back during standing activities. Will continue to strengthen and stretch muscles.    Plan:    Continue to progress strengthening exercises and incorporate stretching to reduce pulling sensation    OP EDUCATION:   " Educated on importance of HEP    Goals:   Active       PT Problem       PT Goal 1       Start:  05/15/24    Expected End:  08/03/24       Pt will be 100% IND with HEP in 6 weeks in order to maintain progress with therapy.           PT Goal 2       Start:  05/15/24    Expected End:  08/03/24       Pt will reduce pain levels to no more than 2/10 in 6 weeks in order to improve sleep, ambulation, standing for cooking/cleaning and dressing.          PT Goal 3       Start:  05/15/24    Expected End:  08/03/24       Pt will be able to perform prolonged standing and ambulation for 20 min in 6 weeks required for cooking, cleaning and ambulation.          PT Goal 4       Start:  05/15/24    Expected End:  08/03/24       Pt will be able to perform community ambulation demonstrating reduced trendelenburg without pain increase in 6 weeks for home and community ambulation needs.          PT Goal 5       Start:  05/15/24    Expected End:  08/03/24       Pt will demonstrate subjective improvement of ADLs and recreational activities through improved score of 12 on RACHEL in 6 weeks.

## 2024-07-03 ENCOUNTER — DOCUMENTATION (OUTPATIENT)
Dept: PHYSICAL THERAPY | Facility: CLINIC | Age: 79
End: 2024-07-03
Payer: COMMERCIAL

## 2024-07-03 ENCOUNTER — APPOINTMENT (OUTPATIENT)
Dept: PHYSICAL THERAPY | Facility: CLINIC | Age: 79
End: 2024-07-03
Payer: COMMERCIAL

## 2024-07-03 NOTE — PROGRESS NOTES
"Physical Therapy                 Therapy Communication Note    Patient Name: Nuria Valdivia \"Maria M\"  MRN: [id number]  Today's Date: 7/3/2024     Discipline: Physical Therapy    Missed Visit Reason:      Missed Time: Cancel    Comment: Pt called in ill, unable to make appointment   "

## 2024-07-09 NOTE — PROGRESS NOTES
[facility]  Follow Up Office Visit Note 7/10/2024    Patient Information: Nuria Valdivia, MRN: [id number], : 1945   Primary Care/Referring Physician: Cole Wong MD, [address] [place] [address] / [place] [address]     Chief Complaint: Low back and right leg pain    Interval History: Ms. Valdivia presents today for follow up. At her last visit I increased her Duloxetine and discussed possibly getting an MRI.    Today she reports no major changes since last seen. She has been doing PT for the past 2 months and is maintaining a regular HEP but hasn't noticed much improvement in her pain or ability to walk/stand. She hasn't noticed much improvement with the increased dose of Duloxetine but denies any side effects.    Brief History of Pain: Ms. Nuria Valdivia is a 79 y.o. female with a PMHx of HTN, HLD, GERD, anxiety, depression who presents for low back and right leg pain.    She reports onset of pain approximately 3 months ago. She describes a constant back ache and has difficulty with prolonged standing. She walks very slowly and has to use a cane. She reports reports radiation of pain down the back of her right leg. She has some numbness around her right knee but relates this to her prior right knee revision surgery. She denies any cramping or feelings of heaviness in her legs. She has minimal pain with sitting. She can walk further with using a shopping cart. She has not started PT yet, as she was thinking she might get an injection today and then could start the therapy.    Current Pain Medications: Duloxetine 60 mg daily, Gabapentin 100 mg BID, Meloxicam 15 mg daily  Previously Tried Pain Medications: Lidocaine patches - no benefit    Relevant Surgeries: History of bilateral TSA, right knee TKA. She does report some type of low back surgery in the distant past (details unclear)  Injections: Denies any lumbar spine injections  Physical/Occupational Therapy: Is currently doing PT  without much improvement    Medications:   Current Outpatient Medications   Medication Instructions    acetaminophen (TylenoL) 325 mg tablet 2 tablets as needed Orally every 6 hrs    calcium citrate-vitamin D3 315 mg-6.25 mcg (250 unit) tablet 2 tab(s) orally once a day    cholecalciferol (Vitamin D-3) 50 MCG (2000 UT) tablet 1 tablet, oral, Daily    cyanocobalamin (Vitamin B-12) 1,000 mcg tablet 1 tablet, oral, Weekly, weekly    docusate sodium (COLACE) 100 mg, oral, 2 times daily PRN    DULoxetine (CYMBALTA) 60 mg, oral, Daily    gabapentin (NEURONTIN) 100 mg, oral, 2 times daily    glucosam/chond/hyalu/CF borate (MOVE FREE JOINT HEALTH ORAL) 1 tablet, oral, Daily    L. acidophilus/Bifid. animalis 15.5 billion cell capsule 1 cap(s) orally once a day    loratadine (Claritin) 10 mg tablet      losartan (Cozaar) 50 mg tablet TAKE 1 TABLET ONCE DAILY    magnesium oxide 500 mg, oral, Daily, Take with food.    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 09/15/2023    Benign hypertension 09/15/2023    Essential tremor 09/15/2023    Fatty liver 09/15/2023    High cholesterol 09/15/2023    History of arthroplasty of right shoulder 09/15/2023    History of hypertension 02/12/2024    Hypercholesterolemia 09/15/2023    Mixed anxiety depressive disorder 09/15/2023    Pain due to knee joint prosthesis (CMS-HCC) 09/15/2023    Pain of left shoulder region 02/12/2024    Personal history of other diseases of the  circulatory system     History of hypertension    Personal history of other diseases of the musculoskeletal system and connective tissue     Personal history of arthritis    Personal history of other diseases of the musculoskeletal system and connective tissue     Personal history of spinal stenosis    Primary localized osteoarthrosis 11/12/2008    Rotator cuff arthropathy of right shoulder 09/15/2023    Spinal stenosis of lumbar region 09/15/2023    Steatosis of liver 09/15/2023    Unspecified menopausal and perimenopausal disorder 09/15/2023    Vitamin B12 deficiency without anemia 09/15/2023      Past Surgical History:   Procedure Laterality Date    BI MAMMO GUIDED BREAST RIGHT LOCALIZATION Right 10/28/2013    BI MAMMO GUIDED LOCALIZATION BREAST RIGHT [facility]    BREAST BIOPSY Left     Excisional Bx 2005 - Benign    BREAST BIOPSY Right     Excisional Bx 2013 - Benign    BREAST BIOPSY Right     Stereotactic Bx 2013 - [facility]    TONSILLECTOMY  04/30/2014    Tonsillectomy       Family History   Problem Relation Name Age of Onset    Heart failure Mother      Restless legs syndrome Mother      Heart disease Mother      Diabetes Mother      Tremor Mother          essential    Testicular cancer Father  62    Heart disease Father      Lupus Sister      Pulmonary embolism Sister      Tremor Brother          essential     Social History     Socioeconomic History    Marital status:      Spouse name: Not on file    Number of children: Not on file    Years of education: Not on file    Highest education level: Not on file   Occupational History    Not on file   Tobacco Use    Smoking status: Never     Passive exposure: Never    Smokeless tobacco: Never   Vaping Use    Vaping status: Never Used   Substance and Sexual Activity    Alcohol use: Yes    Drug use: Never    Sexual activity: Not on file   Other Topics Concern    Not on file   Social History Narrative    Not on file     Social Determinants of Health  "    Financial Resource Strain: Not on file   Food Insecurity: Not on file   Transportation Needs: Not on file   Physical Activity: Not on file   Stress: Not on file   Social Connections: Not on file   Intimate Partner Violence: Not on file   Housing Stability: Not on file       Problems, Past medical history, past surgical history, Medications, allergies, social and family history reviewed and as per the electronic medical record from today's encounter    Review of Systems:  CONST: No fever, chills, fatigue, weight changes  EYES: No loss of vision  ENT: No hearing loss, tinnitus  CV: No chest pain, palpitations  RESP: No dyspnea, shortness of breath, cough  GI: No stool incontinence, nausea, vomiting  : No urinary incontinence  MSK: No joint swelling  SKIN: No rash, no hives  NEURO: No headache, dizziness  PSYCH: Hx of anxiety, depression  HEM/LYMPH: No easy bruising or bleeding  All other systems reviewed are negative     Physical Exam:  Vitals: /72   Pulse 70   Resp 20   Ht 1.6 m (5' 3\")   Wt 110 kg (242 lb)   SpO2 94%   BMI 42.87 kg/m²   General: No apparent distress. Alert, appropriate, oriented x 3. Mood and affect normal. Speaking in full sentences.  HENT: Normocephalic, atraumatic. Hearing intact.  Eyes: Extraocular movements grossly intact. Pupils equal and round.   Neck: Supple, trachea midline.  Lungs: Symmetric respiratory excursion on visual exam, nonlabored breathing.  Extremities: No clubbing, cyanosis noted in arms or legs.  Skin: No rashes, lesions, alopecia noted on back or extremities.   Neuro: Alert and appropriate. Using a walker to ambulate. Bulk and tone within normal limits.    Laboratory Data:  The following laboratory data were reviewed during this visit:   Lab Results   Component Value Date    WBC 7.8 01/08/2024    RBC 5.02 01/08/2024    HGB 14.7 01/08/2024    HCT 45.7 01/08/2024     01/08/2024      No results found for: \"INR\"  Lab Results   Component Value Date    " CREATININE 0.90 01/08/2024    HGBA1C 5.1 06/10/2022       Imaging:  The following imaging impressions were reviewed by me during this visit:    -4/26/24 lumbar spine xray shows levocurvature of the lumbar spine. There is severe multilevel disc space narrowing with osteophytosis closed throughout the lumbar spine worse at L4-L5 and L5-S1. The degree of levocurvature limits evaluation however there is suggestion of anterolisthesis of L3 on L4. There is severe facet disease in the lower lumbar spine as well the no definite fracture seen    I also personally reviewed the images from the above studies myself. These images and my interpretation of them contributed to the management and decision making of the patient's medical plan.    ASSESSMENT:  Ms. Nuria Valdivia is a 79 y.o. female with low back and right leg pain that is consistent with:    1. Spinal stenosis of lumbar region with neurogenic claudication    2. Lumbar radiculopathy    3. Lumbar spondylosis    4. DDD (degenerative disc disease), lumbar    5. Myofascial pain          PLAN:    Diagnostics:   - I reviewed her lumbar spine xray, which shows severe multi-level degenerative changes.  Her pain is not improving with additional conservative measures including physical therapy thus I recommend a lumbar spine MRI to evaluate for lumbar spinal stenosis and for interventional planning purposes.    Physical Therapy and Rehabilitation:     -She should continue her current physical therapy and maintain a regular home exercise and stretching program    Psychologically:  - No needs at this time    Medications  - Continue Duloxetine 60 mg daily.  - Continue Meloxicam 15 mg daily  - Continue Gabapentin 100 mg BID    Duration  - 3 months    Interventions:  - Her pain is likely multi-factorial secondary to lumbar spinal stenosis with neurogenic claudication, lumbar radiculopathy, lumbar spondylosis, and myofascial pain. Given her severe, multi-level degenerative  changes, scoliosis, and reported lumbar spine surgery in the past, I would consider lumbar SHARAD in the future, but would want to get a lumbar spine MRI for interventional planning and safety purposes prior to pursuing this        Sincerely,  Remberto Mclaughlin MD  [facility] - [place]

## 2024-07-10 ENCOUNTER — OFFICE VISIT (OUTPATIENT)
Dept: PAIN MEDICINE | Facility: CLINIC | Age: 79
End: 2024-07-10
Payer: COMMERCIAL

## 2024-07-10 ENCOUNTER — TREATMENT (OUTPATIENT)
Dept: PHYSICAL THERAPY | Facility: CLINIC | Age: 79
End: 2024-07-10
Payer: COMMERCIAL

## 2024-07-10 VITALS
BODY MASS INDEX: 42.88 KG/M2 | DIASTOLIC BLOOD PRESSURE: 72 MMHG | HEIGHT: 63 IN | OXYGEN SATURATION: 94 % | RESPIRATION RATE: 20 BRPM | WEIGHT: 242 LBS | SYSTOLIC BLOOD PRESSURE: 130 MMHG | HEART RATE: 70 BPM

## 2024-07-10 DIAGNOSIS — G89.29 CHRONIC LOW BACK PAIN, UNSPECIFIED BACK PAIN LATERALITY, UNSPECIFIED WHETHER SCIATICA PRESENT: ICD-10-CM

## 2024-07-10 DIAGNOSIS — M51.36 DDD (DEGENERATIVE DISC DISEASE), LUMBAR: ICD-10-CM

## 2024-07-10 DIAGNOSIS — M54.16 LUMBAR RADICULOPATHY: ICD-10-CM

## 2024-07-10 DIAGNOSIS — M48.062 SPINAL STENOSIS OF LUMBAR REGION WITH NEUROGENIC CLAUDICATION: Primary | ICD-10-CM

## 2024-07-10 DIAGNOSIS — M79.18 MYOFASCIAL PAIN: ICD-10-CM

## 2024-07-10 DIAGNOSIS — M54.50 CHRONIC LOW BACK PAIN, UNSPECIFIED BACK PAIN LATERALITY, UNSPECIFIED WHETHER SCIATICA PRESENT: ICD-10-CM

## 2024-07-10 DIAGNOSIS — M47.816 LUMBAR SPONDYLOSIS: ICD-10-CM

## 2024-07-10 PROCEDURE — 1036F TOBACCO NON-USER: CPT | Performed by: STUDENT IN AN ORGANIZED HEALTH CARE EDUCATION/TRAINING PROGRAM

## 2024-07-10 PROCEDURE — 1125F AMNT PAIN NOTED PAIN PRSNT: CPT | Performed by: STUDENT IN AN ORGANIZED HEALTH CARE EDUCATION/TRAINING PROGRAM

## 2024-07-10 PROCEDURE — 3078F DIAST BP <80 MM HG: CPT | Performed by: STUDENT IN AN ORGANIZED HEALTH CARE EDUCATION/TRAINING PROGRAM

## 2024-07-10 PROCEDURE — 97110 THERAPEUTIC EXERCISES: CPT | Mod: GP | Performed by: PHYSICAL THERAPIST

## 2024-07-10 PROCEDURE — 3075F SYST BP GE 130 - 139MM HG: CPT | Performed by: STUDENT IN AN ORGANIZED HEALTH CARE EDUCATION/TRAINING PROGRAM

## 2024-07-10 PROCEDURE — 99214 OFFICE O/P EST MOD 30 MIN: CPT | Performed by: STUDENT IN AN ORGANIZED HEALTH CARE EDUCATION/TRAINING PROGRAM

## 2024-07-10 PROCEDURE — 1160F RVW MEDS BY RX/DR IN RCRD: CPT | Performed by: STUDENT IN AN ORGANIZED HEALTH CARE EDUCATION/TRAINING PROGRAM

## 2024-07-10 PROCEDURE — 1159F MED LIST DOCD IN RCRD: CPT | Performed by: STUDENT IN AN ORGANIZED HEALTH CARE EDUCATION/TRAINING PROGRAM

## 2024-07-10 ASSESSMENT — PAIN - FUNCTIONAL ASSESSMENT
PAIN_FUNCTIONAL_ASSESSMENT: 0-10
PAIN_FUNCTIONAL_ASSESSMENT: 0-10

## 2024-07-10 ASSESSMENT — PATIENT HEALTH QUESTIONNAIRE - PHQ9
1. LITTLE INTEREST OR PLEASURE IN DOING THINGS: NOT AT ALL
SUM OF ALL RESPONSES TO PHQ9 QUESTIONS 1 AND 2: 0
2. FEELING DOWN, DEPRESSED OR HOPELESS: NOT AT ALL

## 2024-07-10 ASSESSMENT — PAIN SCALES - GENERAL
PAINLEVEL: 7
PAINLEVEL_OUTOF10: 5 - MODERATE PAIN
PAINLEVEL_OUTOF10: 7

## 2024-07-10 ASSESSMENT — PAIN DESCRIPTION - DESCRIPTORS: DESCRIPTORS: SHARP

## 2024-07-10 NOTE — PROGRESS NOTES
"Physical Therapy Treatment    Patient Name: Nuria Valdivia  MRN: [id number]  Today's Date: 7/10/2024  Time Calculation  Start Time: 1317  Stop Time: 1358  Time Calculation (min): 41 min  PT Therapeutic Procedures Time Entry  Therapeutic Exercise Time Entry: 41    Insurance:  Visit number: 5 of 7  Authorization info: NO AUTH / [place] VISITS / $40 COPAY   Insurance Type: Payor: [employer] / Plan: [employer] / Product Type: *No Product type* /     Current Problem   1. Chronic low back pain, unspecified back pain laterality, unspecified whether sciatica present  Follow Up In Physical Therapy          Subjective   General   General Comment: Pt reports that things have been okay. She went to pain management and will be getting an MRI.  Precautions:  Precautions  Precautions Comment: Fall history  Pain   Pain Assessment: 0-10  0-10 (Numeric) Pain Score: 5 - Moderate pain  Post Treatment Pain Level 5    Objective   Groin pain during seated activities    Treatments:  Therapeutic Exercise:  Therapeutic Exercise  Therapeutic Exercise Performed: Yes  Therapeutic Exercise Activity 1: Nustep L4, 6'  Therapeutic Exercise Activity 2: standing alternating taps 2\" box 3x10  Therapeutic Exercise Activity 3: standing lateral taps 3x10  Therapeutic Exercise Activity 4: standing marches 3x10  Therapeutic Exercise Activity 5: seated hip ADD squeezes 1x10 (pain in groin, discontinued exercise)  Therapeutic Exercise Activity 6: seated LAQ 2x10  Therapeutic Exercise Activity 7: supine heel slides 2x10      Assessment   Assessment:   PT Assessment  Assessment Comment: Pt tolerated session well. She was able to complete most of the assigned activities with pain noted during seated exercises in the groin area. Pt was able to complete heel slides in place with no pain. PT and pt discussed further therapy visits and both are in agreement to hold sessions until after MRI results.    Plan:    Hold therapy sessions until after " MRI    OP EDUCATION:   Educated on continuing exercises while waiting for MRI results    Goals:   Active       PT Problem       PT Goal 1       Start:  05/15/24    Expected End:  08/03/24       Pt will be 100% IND with HEP in 6 weeks in order to maintain progress with therapy.           PT Goal 2       Start:  05/15/24    Expected End:  08/03/24       Pt will reduce pain levels to no more than 2/10 in 6 weeks in order to improve sleep, ambulation, standing for cooking/cleaning and dressing.          PT Goal 3       Start:  05/15/24    Expected End:  08/03/24       Pt will be able to perform prolonged standing and ambulation for 20 min in 6 weeks required for cooking, cleaning and ambulation.          PT Goal 4       Start:  05/15/24    Expected End:  08/03/24       Pt will be able to perform community ambulation demonstrating reduced trendelenburg without pain increase in 6 weeks for home and community ambulation needs.          PT Goal 5       Start:  05/15/24    Expected End:  08/03/24       Pt will demonstrate subjective improvement of ADLs and recreational activities through improved score of 12 on RACHEL in 6 weeks.

## 2024-07-18 DIAGNOSIS — M19.90 OTHER TYPE OF OSTEOARTHRITIS, UNSPECIFIED SITE: ICD-10-CM

## 2024-07-18 RX ORDER — MELOXICAM 15 MG/1
15 TABLET ORAL DAILY
Qty: 14 TABLET | Refills: 0 | Status: SHIPPED | OUTPATIENT
Start: 2024-07-18 | End: 2024-07-19 | Stop reason: SDUPTHER

## 2024-07-19 DIAGNOSIS — M19.90 OTHER TYPE OF OSTEOARTHRITIS, UNSPECIFIED SITE: ICD-10-CM

## 2024-07-19 RX ORDER — MELOXICAM 15 MG/1
15 TABLET ORAL DAILY
Qty: 90 TABLET | Refills: 3 | Status: SHIPPED | OUTPATIENT
Start: 2024-07-19

## 2024-07-19 NOTE — TELEPHONE ENCOUNTER
LV 2/29/24, NV 9/25/24 Jamar    Meloxicam 15 mg  - 14 tabs sent to local pharm yesterday.  Pt did not .  Asking if can send 90-day supply with refills to mail order.    [facility]

## 2024-07-26 ENCOUNTER — HOSPITAL ENCOUNTER (OUTPATIENT)
Dept: RADIOLOGY | Facility: HOSPITAL | Age: 79
Discharge: HOME | End: 2024-07-26
Payer: COMMERCIAL

## 2024-07-26 DIAGNOSIS — M48.062 SPINAL STENOSIS OF LUMBAR REGION WITH NEUROGENIC CLAUDICATION: ICD-10-CM

## 2024-07-26 DIAGNOSIS — M54.16 LUMBAR RADICULOPATHY: ICD-10-CM

## 2024-07-26 PROCEDURE — 72148 MRI LUMBAR SPINE W/O DYE: CPT

## 2024-08-02 ENCOUNTER — PREP FOR PROCEDURE (OUTPATIENT)
Dept: RADIOLOGY | Facility: EXTERNAL LOCATION | Age: 79
End: 2024-08-02
Payer: COMMERCIAL

## 2024-08-02 DIAGNOSIS — M96.1 LUMBAR POST-LAMINECTOMY SYNDROME: Primary | ICD-10-CM

## 2024-08-02 DIAGNOSIS — M54.16 LUMBAR RADICULOPATHY: ICD-10-CM

## 2024-08-12 ENCOUNTER — TELEPHONE (OUTPATIENT)
Dept: PAIN MEDICINE | Facility: CLINIC | Age: 79
End: 2024-08-12
Payer: COMMERCIAL

## 2024-08-23 ENCOUNTER — ANCILLARY PROCEDURE (OUTPATIENT)
Dept: RADIOLOGY | Facility: EXTERNAL LOCATION | Age: 79
End: 2024-08-23
Payer: COMMERCIAL

## 2024-08-23 DIAGNOSIS — M96.1 POSTLAMINECTOMY SYNDROME, NOT ELSEWHERE CLASSIFIED: ICD-10-CM

## 2024-08-23 PROCEDURE — 62323 NJX INTERLAMINAR LMBR/SAC: CPT | Performed by: STUDENT IN AN ORGANIZED HEALTH CARE EDUCATION/TRAINING PROGRAM

## 2024-08-23 NOTE — PROGRESS NOTES
Procedure Note: 2024    PROCEDURE NAME: Interlaminar Lumbar Epidural Steroid Injection at L5/S1    Patient Information: Nuria Valdivia, MRN (from [facility]) [id number], : 1945  Chief Complaint: Low back and leg pain    Pain Diagnosis: The diagnosis of Postlaminectomy syndrome, not elsewhere classified  has been made given the patient's clinical evaluation at prior evaluation.      DESCRIPTION OF PROCEDURE:    Nuria Valdivia was brought to the procedure room. The assistant obtained vital signs, which were found to be stable. She was then informed of the procedure, its benefits, and its risks, and her questions were answered regarding the procedure. Written informed consent was obtained from the patient. Immediately prior to starting the procedure, a time-out safety check was conducted and the patient's identification, procedure name, and procedure site were confirmed with the patient.    Right Paramedian Interlaminar Lumbar Epidural Steroid Injection, L5/S1  After informed written consent was obtained, the patient was placed in prone position with a pillow under the abdomen to reduce lumbar lordosis. Monitoring of pulse oximetry, heart rate, and blood pressure was done pre-procedure, and post-procedure. During the procedure the patient was monitored with continuous pulse-ox. A time out was performed before the beginning of the procedure. The correct site and laterality were marked. The skin was prepped with chlorhexidine, allowed to dry for a minimum of 3 minutes, and draped in a sterile fashion.     The L5/S1 vertebral level was identified with use of fluoroscopy in AP view with slight caudal tilt, and the target in the interlaminar space was identified and marked. The skin and subcutaneous tissue were anesthetized using 3 ml of 1% lidocaine with a 25 G 1.5 inch needle. A 4.5-inch 20 G Tuohy needle was slowly advanced towards the posterior epidural space until it was felt to be engaged in the  ligamentum flavum. Needle position was confirmed with contralateral oblique views. The epidural space was accessed using loss of resistance technique to normal saline. After negative aspiration for blood or CSF, 0.5 ml of Omnipaque contrast was injected to confirm the needle placement. A mixture of 40 mg triamcinolone diluted in 1 mL lidocaine 1% and 1 mL sterile normal saline with a total volume of 3 mL was injected in increments. The stylet was reinserted and the needle was removed      Anesthesia: local anesthesia   Complications: none      Remberto Mclaughlin MD

## 2024-09-17 ENCOUNTER — OFFICE VISIT (OUTPATIENT)
Dept: PAIN MEDICINE | Facility: CLINIC | Age: 79
End: 2024-09-17
Payer: COMMERCIAL

## 2024-09-17 VITALS
BODY MASS INDEX: 42.17 KG/M2 | HEART RATE: 64 BPM | OXYGEN SATURATION: 94 % | DIASTOLIC BLOOD PRESSURE: 83 MMHG | SYSTOLIC BLOOD PRESSURE: 122 MMHG | HEIGHT: 63 IN | WEIGHT: 238 LBS

## 2024-09-17 DIAGNOSIS — M48.061 SPINAL STENOSIS OF LUMBAR REGION WITHOUT NEUROGENIC CLAUDICATION: Primary | ICD-10-CM

## 2024-09-17 DIAGNOSIS — M96.1 POSTLAMINECTOMY SYNDROME OF LUMBAR REGION: ICD-10-CM

## 2024-09-17 PROBLEM — F41.8 MIXED ANXIETY DEPRESSIVE DISORDER: Status: ACTIVE | Noted: 2024-09-17

## 2024-09-17 PROBLEM — N60.91: Status: ACTIVE | Noted: 2024-09-17

## 2024-09-17 PROBLEM — K76.0 STEATOSIS OF LIVER: Status: ACTIVE | Noted: 2024-09-17

## 2024-09-17 PROBLEM — E78.00 HYPERCHOLESTEROLEMIA: Status: ACTIVE | Noted: 2024-09-17

## 2024-09-17 PROBLEM — Z96.611 HISTORY OF ARTHROPLASTY OF RIGHT SHOULDER: Status: ACTIVE | Noted: 2024-09-17

## 2024-09-17 PROCEDURE — 1159F MED LIST DOCD IN RCRD: CPT | Performed by: NURSE PRACTITIONER

## 2024-09-17 PROCEDURE — 3074F SYST BP LT 130 MM HG: CPT | Performed by: NURSE PRACTITIONER

## 2024-09-17 PROCEDURE — 1036F TOBACCO NON-USER: CPT | Performed by: NURSE PRACTITIONER

## 2024-09-17 PROCEDURE — 3079F DIAST BP 80-89 MM HG: CPT | Performed by: NURSE PRACTITIONER

## 2024-09-17 PROCEDURE — 99213 OFFICE O/P EST LOW 20 MIN: CPT | Performed by: NURSE PRACTITIONER

## 2024-09-17 ASSESSMENT — PAIN SCALES - GENERAL
PAINLEVEL: 5
PAINLEVEL_OUTOF10: 5 - MODERATE PAIN

## 2024-09-17 ASSESSMENT — PAIN DESCRIPTION - DESCRIPTORS: DESCRIPTORS: ACHING

## 2024-09-17 ASSESSMENT — PAIN - FUNCTIONAL ASSESSMENT: PAIN_FUNCTIONAL_ASSESSMENT: 0-10

## 2024-09-17 NOTE — PROGRESS NOTES
"Subjective   Patient ID: Nuria Valdivia \"Boyd" is a 79 y.o. female who presents for No chief complaint on file..    HPI 79-year-old female with postlaminectomy syndrome of the lumbar spine presents for a follow-up to her L5/S1 interlaminar lumbar epidural steroid injection with Dr. Mclaughlin on 8/23/2024. Unfortunately she reports only about 10-15% improvement in symptoms post-injection. She continues to have pain along the sacrum, into the low back and across the bilateral pelvis/hip areas. She would like to discuss alternative therapies.     Review of Systems Unless noted in the HPI all other systems have been reviewed and are negative for complaint.     Objective   Physical Exam  General- No acute distress, well appearing and well nourished.   Eyes Conjunctiva and lids: No erythema, swelling or discharge  Neck - Supple, no cervical lymphadenopathy.   Pulmonary - Respiratory effort: Normal respiration.   Cardiovascular - Normal rate and rhythm.  Examination of extremities for edema and/or varicosities: No peripheral edema  Abdomen: Soft, Non-tender, non-distended, no abdominal masses.   Musculoskeletal - Range of motion: decreased ROM to the lumbar spine.   Skin - Skin and subcutaneous tissue: Normal without rashes or lesions.  Neurologic - Reflexes: Normal. Coordination: Antalgic gait; use of cane for ambulation   Psychiatric - Orientation to person, place, and time: Normal. Mood and affect: Normal.    Assessment/Plan   Assessment & Plan  Spinal stenosis of lumbar region without neurogenic claudication    Orders:    [place] pain management; Future    Epidural Steroid Injection; Future    Postlaminectomy syndrome of lumbar region    Orders:    [place] pain management; Future    Epidural Steroid Injection; Future    TREATMENT PLAN:  I had a nice discussion with the patient today and our plan will be as follows:  Radiology: No new imaging to review at this time.   Physically: Encouraged patient to continue with " increased physical activity as able.   Psychologically: No need for psychologic intervention from my standpoint. There are no mental health issues of which I am aware that are contributing to the patient's pain. There are no substance abuse or alcohol abuse issues of which I am aware that are contributing to the patient's pain.   Medication: Nothing at this time.   Duration: Chronic/ongoing.    Intervention: Patient is s/p L5/S1 interlaminar lumbar epidural steroid injection with Dr. Mclaughlin on 8/23/2024 with minimal results. Based on her physical exam findings and complaints she is an excellent candidate for a caudal epidural steroid injection to be performed under fluoroscopic guidance by Dr. Mclaughlin.

## 2024-09-23 ENCOUNTER — TELEPHONE (OUTPATIENT)
Dept: PAIN MEDICINE | Facility: CLINIC | Age: 79
End: 2024-09-23

## 2024-09-23 ENCOUNTER — LAB (OUTPATIENT)
Dept: LAB | Facility: LAB | Age: 79
End: 2024-09-23
Payer: COMMERCIAL

## 2024-09-23 DIAGNOSIS — E53.8 VITAMIN B12 DEFICIENCY WITHOUT ANEMIA: ICD-10-CM

## 2024-09-23 DIAGNOSIS — R73.01 IMPAIRED FASTING BLOOD SUGAR: ICD-10-CM

## 2024-09-23 DIAGNOSIS — E78.2 MIXED HYPERLIPIDEMIA: ICD-10-CM

## 2024-09-23 DIAGNOSIS — E55.9 VITAMIN D DEFICIENCY: ICD-10-CM

## 2024-09-23 LAB
25(OH)D3 SERPL-MCNC: 60 NG/ML (ref 30–100)
ALBUMIN SERPL BCP-MCNC: 3.7 G/DL (ref 3.4–5)
ALP SERPL-CCNC: 125 U/L (ref 33–136)
ALT SERPL W P-5'-P-CCNC: 15 U/L (ref 7–45)
ANION GAP SERPL CALCULATED.3IONS-SCNC: 9 MMOL/L (ref 10–20)
AST SERPL W P-5'-P-CCNC: 19 U/L (ref 9–39)
BILIRUB SERPL-MCNC: 1 MG/DL (ref 0–1.2)
BUN SERPL-MCNC: 14 MG/DL (ref 6–23)
CALCIUM SERPL-MCNC: 8.9 MG/DL (ref 8.6–10.3)
CHLORIDE SERPL-SCNC: 102 MMOL/L (ref 98–107)
CHOLEST SERPL-MCNC: 190 MG/DL (ref 0–199)
CHOLEST/HDLC SERPL: 3.7 {RATIO}
CO2 SERPL-SCNC: 31 MMOL/L (ref 21–32)
CREAT SERPL-MCNC: 0.8 MG/DL (ref 0.5–1.05)
EGFRCR SERPLBLD CKD-EPI 2021: 76 ML/MIN/1.73M*2
ERYTHROCYTE [DISTWIDTH] IN BLOOD BY AUTOMATED COUNT: 13.7 % (ref 11.5–14.5)
EST. AVERAGE GLUCOSE BLD GHB EST-MCNC: 100 MG/DL
GLUCOSE SERPL-MCNC: 91 MG/DL (ref 74–99)
HBA1C MFR BLD: 5.1 %
HCT VFR BLD AUTO: 45 % (ref 36–46)
HDLC SERPL-MCNC: 51.3 MG/DL
HGB BLD-MCNC: 14.3 G/DL (ref 12–16)
LDLC SERPL CALC-MCNC: 119 MG/DL
MCH RBC QN AUTO: 29.5 PG (ref 26–34)
MCHC RBC AUTO-ENTMCNC: 31.8 G/DL (ref 32–36)
MCV RBC AUTO: 93 FL (ref 80–100)
NON HDL CHOLESTEROL: 139 MG/DL (ref 0–149)
NRBC BLD-RTO: 0 /100 WBCS (ref 0–0)
PLATELET # BLD AUTO: 269 X10*3/UL (ref 150–450)
POTASSIUM SERPL-SCNC: 4.3 MMOL/L (ref 3.5–5.3)
PROT SERPL-MCNC: 6.6 G/DL (ref 6.4–8.2)
RBC # BLD AUTO: 4.85 X10*6/UL (ref 4–5.2)
SODIUM SERPL-SCNC: 138 MMOL/L (ref 136–145)
TRIGL SERPL-MCNC: 101 MG/DL (ref 0–149)
VIT B12 SERPL-MCNC: 303 PG/ML (ref 211–911)
VLDL: 20 MG/DL (ref 0–40)
WBC # BLD AUTO: 7.1 X10*3/UL (ref 4.4–11.3)

## 2024-09-23 PROCEDURE — 36415 COLL VENOUS BLD VENIPUNCTURE: CPT

## 2024-09-23 PROCEDURE — 83036 HEMOGLOBIN GLYCOSYLATED A1C: CPT

## 2024-09-23 PROCEDURE — 80053 COMPREHEN METABOLIC PANEL: CPT

## 2024-09-23 PROCEDURE — 82607 VITAMIN B-12: CPT

## 2024-09-23 PROCEDURE — 85027 COMPLETE CBC AUTOMATED: CPT

## 2024-09-23 PROCEDURE — 80061 LIPID PANEL: CPT

## 2024-09-23 PROCEDURE — 82306 VITAMIN D 25 HYDROXY: CPT

## 2024-09-25 ENCOUNTER — OFFICE VISIT (OUTPATIENT)
Dept: PRIMARY CARE | Facility: CLINIC | Age: 79
End: 2024-09-25
Payer: COMMERCIAL

## 2024-09-25 VITALS
SYSTOLIC BLOOD PRESSURE: 118 MMHG | HEART RATE: 78 BPM | DIASTOLIC BLOOD PRESSURE: 90 MMHG | OXYGEN SATURATION: 97 % | BODY MASS INDEX: 42.52 KG/M2 | HEIGHT: 63 IN | TEMPERATURE: 97.5 F | WEIGHT: 240 LBS

## 2024-09-25 DIAGNOSIS — G25.0 ESSENTIAL TREMOR: ICD-10-CM

## 2024-09-25 DIAGNOSIS — Z76.89 ENCOUNTER TO ESTABLISH CARE WITH NEW DOCTOR: Primary | ICD-10-CM

## 2024-09-25 DIAGNOSIS — K21.9 GASTROESOPHAGEAL REFLUX DISEASE, UNSPECIFIED WHETHER ESOPHAGITIS PRESENT: ICD-10-CM

## 2024-09-25 DIAGNOSIS — M19.90 OTHER TYPE OF OSTEOARTHRITIS, UNSPECIFIED SITE: ICD-10-CM

## 2024-09-25 DIAGNOSIS — M48.061 SPINAL STENOSIS OF LUMBAR REGION WITHOUT NEUROGENIC CLAUDICATION: ICD-10-CM

## 2024-09-25 DIAGNOSIS — I10 BENIGN HYPERTENSION: ICD-10-CM

## 2024-09-25 PROCEDURE — 1159F MED LIST DOCD IN RCRD: CPT | Performed by: INTERNAL MEDICINE

## 2024-09-25 PROCEDURE — 3080F DIAST BP >= 90 MM HG: CPT | Performed by: INTERNAL MEDICINE

## 2024-09-25 PROCEDURE — 99213 OFFICE O/P EST LOW 20 MIN: CPT | Performed by: INTERNAL MEDICINE

## 2024-09-25 PROCEDURE — G2211 COMPLEX E/M VISIT ADD ON: HCPCS | Performed by: INTERNAL MEDICINE

## 2024-09-25 PROCEDURE — 3074F SYST BP LT 130 MM HG: CPT | Performed by: INTERNAL MEDICINE

## 2024-09-25 PROCEDURE — 1125F AMNT PAIN NOTED PAIN PRSNT: CPT | Performed by: INTERNAL MEDICINE

## 2024-09-25 RX ORDER — GABAPENTIN 100 MG/1
100 CAPSULE ORAL 2 TIMES DAILY
Qty: 200 CAPSULE | Refills: 2 | Status: SHIPPED | OUTPATIENT
Start: 2024-09-25

## 2024-09-25 RX ORDER — OMEPRAZOLE 20 MG/1
20 CAPSULE, DELAYED RELEASE ORAL DAILY
Qty: 100 CAPSULE | Refills: 2 | Status: SHIPPED | OUTPATIENT
Start: 2024-09-25

## 2024-09-25 RX ORDER — MELOXICAM 15 MG/1
15 TABLET ORAL DAILY
Qty: 100 TABLET | Refills: 2 | Status: SHIPPED | OUTPATIENT
Start: 2024-09-25

## 2024-09-25 RX ORDER — PROPRANOLOL HYDROCHLORIDE 20 MG/1
TABLET ORAL
Qty: 360 TABLET | Refills: 2 | Status: SHIPPED | OUTPATIENT
Start: 2024-09-25

## 2024-09-25 RX ORDER — LOSARTAN POTASSIUM 50 MG/1
TABLET ORAL
Qty: 100 TABLET | Refills: 2 | Status: SHIPPED | OUTPATIENT
Start: 2024-09-25

## 2024-09-25 ASSESSMENT — PAIN SCALES - GENERAL: PAINLEVEL: 3

## 2024-09-25 NOTE — PROGRESS NOTES
[facility]: [facility]  PROGRESS NOTE      Nuria Valdivia is a 79 y.o. female that is presenting today for Establish Care (6 mo fu HTN, EIS pt).    Assessment/Plan   Diagnoses and all orders for this visit:  Encounter to establish care with new doctor      - Reviewed patient's available records, discussed PMH, Current active problems Meds and allergies.  Benign hypertension     Under control SBP with current treatment mild increase in DBP  Continue the same meds and stressed low salt diet  Rx E-scripted 100 days x 2  -     losartan (Cozaar) 50 mg tablet; TAKE 1 TABLET ONCE DAILY  -     propranolol (Inderal) 20 mg tablet; 2 tablets in morning,  2 tablet in afternoon Orally daily  Gastroesophageal reflux disease, unspecified whether esophagitis present      GERD   - Patient taking PPIs with symptoms control   - Counseled benefits of dietary changes, weight loss. and Anti-Reflux Measures      Under control with current treatment   Continue the same   Rx E-scripted 100 days x 2  -     omeprazole (PriLOSEC) 20 mg DR capsule; Take 1 capsule (20 mg) by mouth once daily.  Other type of osteoarthritis, unspecified site     Under control with current treatment   Continue the same [Per patient Rx always by TE not pain Mangment ]  Rx E-scripted 100 days x 2  -     gabapentin (Neurontin) 100 mg capsule; Take 1 capsule (100 mg) by mouth 2 times a day.  -     meloxicam (Mobic) 15 mg tablet; Take 1 tablet (15 mg) by mouth once daily.  Essential tremor      Under control with current treatment   Continue the same   Rx E-scripted 100 days x 2     -     propranolol (Inderal) 20 mg tablet; 2 tablets in morning,  2 tablet in afternoon Orally daily  Spinal stenosis of lumbar region without neurogenic claudication      Per pain management   Other orders  -     Follow Up In Primary Care; Future  Subjective   HPI    - Nuria Valdivia 79 y.o. female is here today to establish care., BW results and  Refills. Her main issue is her aches and pains - Chronic arthritic pain which she is following with Pain management for.       - Patient denies any symptoms or concerns at this time.       - patient denies any adverse reactions to or concerns with his/her meds.       - Problem list and medication reconciliation done today.  - V.S. Stable. No changes at this time.  - Encouraged continued diet and exercise modification.     Review of Systems   All pertinent POSITIVES as noted per HPI.  All other systems have been reviewed and are NEGATIVE and /or Noncontributory to this patient current visit or complaint.     Objective   Vitals:    09/25/24 1015   BP: 118/90   Pulse: 78   Temp: 36.4 °C (97.5 °F)   SpO2: 97%      Body mass index is 42.51 kg/m².  Physical Exam  Vitals and nursing note reviewed.   Constitutional:       Appearance: Normal appearance.   HENT:      Head: Normocephalic and atraumatic.   Neck:      Vascular: No carotid bruit.   Cardiovascular:      Rate and Rhythm: Normal rate and regular rhythm.      Pulses: Normal pulses.      Heart sounds: Normal heart sounds.   Pulmonary:      Effort: Pulmonary effort is normal.      Breath sounds: Normal breath sounds.   Abdominal:      General: Abdomen is flat. Bowel sounds are normal.      Palpations: Abdomen is soft.   Musculoskeletal:         General: No swelling. Normal range of motion.      Cervical back: Neck supple.   Lymphadenopathy:      Cervical: No cervical adenopathy.   Skin:     General: Skin is warm and dry.   Neurological:      Mental Status: She is alert.   Psychiatric:         Mood and Affect: Mood normal.     Diagnostic Results   Lab Results   Component Value Date    GLUCOSE 91 09/23/2024    CALCIUM 8.9 09/23/2024     09/23/2024    K 4.3 09/23/2024    CO2 31 09/23/2024     09/23/2024    BUN 14 09/23/2024    CREATININE 0.80 09/23/2024     Lab Results   Component Value Date    ALT 15 09/23/2024    AST 19 09/23/2024    ALKPHOS 125 09/23/2024  "   BILITOT 1.0 09/23/2024     Lab Results   Component Value Date    WBC 7.1 09/23/2024    HGB 14.3 09/23/2024    HCT 45.0 09/23/2024    MCV 93 09/23/2024     09/23/2024     Lab Results   Component Value Date    CHOL 190 09/23/2024    CHOL 198 06/10/2022    CHOL 168 12/19/2018     Lab Results   Component Value Date    HDL 51.3 09/23/2024    HDL 54 06/10/2022    HDL 57 12/19/2018     Lab Results   Component Value Date    LDLCALC 119 (H) 09/23/2024    LDLCALC 124 06/10/2022    LDLCALC 94 12/19/2018     Lab Results   Component Value Date    TRIG 101 09/23/2024    TRIG 102 06/10/2022    TRIG 85 12/19/2018     No components found for: \"CHOLHDL\"  Lab Results   Component Value Date    HGBA1C 5.1 09/23/2024     Other labs not included in the list above were reviewed either before or during this encounter.    History    Past Medical History:   Diagnosis Date    Arthralgia of hip 09/15/2023    Atypical hyperplasia of lactiferous duct of right breast 09/15/2023    Benign hypertension 09/15/2023    Essential tremor 09/15/2023    Fatty liver 09/15/2023    High cholesterol 09/15/2023    History of arthroplasty of right shoulder 09/15/2023    History of hypertension 02/12/2024    Hypercholesterolemia 09/15/2023    Macular degeneration disease     Mixed anxiety depressive disorder 09/15/2023    Pain due to knee joint prosthesis (CMS-HCC) 09/15/2023    Pain of left shoulder region 02/12/2024    Personal history of other diseases of the circulatory system     History of hypertension    Personal history of other diseases of the musculoskeletal system and connective tissue     Personal history of arthritis    Personal history of other diseases of the musculoskeletal system and connective tissue     Personal history of spinal stenosis    Primary localized osteoarthrosis 11/12/2008    Rotator cuff arthropathy of right shoulder 09/15/2023    Spinal stenosis of lumbar region 09/15/2023    Steatosis of liver 09/15/2023    Unspecified " menopausal and perimenopausal disorder 09/15/2023    Vitamin B12 deficiency without anemia 09/15/2023     Past Surgical History:   Procedure Laterality Date    BI MAMMO GUIDED BREAST RIGHT LOCALIZATION Right 10/28/2013    BI MAMMO GUIDED LOCALIZATION BREAST RIGHT [facility]    BREAST BIOPSY Left     Excisional Bx 2005 - Benign    BREAST BIOPSY Right     Excisional Bx 2013 - Benign    BREAST BIOPSY Right     Stereotactic Bx 2013 - [facility]    TONSILLECTOMY  04/30/2014    Tonsillectomy     Family History   Problem Relation Name Age of Onset    Heart failure Mother      Restless legs syndrome Mother      Heart disease Mother      Diabetes Mother      Tremor Mother          essential    Testicular cancer Father  62    Heart disease Father      Lupus Sister      Pulmonary embolism Sister      Tremor Brother          essential     Social History     Socioeconomic History    Marital status:      Spouse name: Not on file    Number of children: Not on file    Years of education: Not on file    Highest education level: Not on file   Occupational History    Not on file   Tobacco Use    Smoking status: Never     Passive exposure: Never    Smokeless tobacco: Never   Vaping Use    Vaping status: Never Used   Substance and Sexual Activity    Alcohol use: Yes    Drug use: Never    Sexual activity: Not on file   Other Topics Concern    Not on file   Social History Narrative    Not on file     Social Determinants of Health     Financial Resource Strain: Not on file   Food Insecurity: Not on file   Transportation Needs: Not on file   Physical Activity: Not on file   Stress: Not on file   Social Connections: Not on file   Intimate Partner Violence: Not on file   Housing Stability: Not on file     Allergies   Allergen Reactions    Morphine Other     nightmares     Current Outpatient Medications on File Prior to Visit   Medication Sig Dispense Refill    acetaminophen (TylenoL) 325 mg tablet 2 tablets as needed Orally every 6  hrs      calcium citrate-vitamin D3 315 mg-6.25 mcg (250 unit) tablet 2 tab(s) orally once a day      cholecalciferol (Vitamin D-3) 50 MCG (2000 UT) tablet Take 1 tablet (2,000 Units) by mouth once daily.      cyanocobalamin (Vitamin B-12) 1,000 mcg tablet Take 1 tablet (1,000 mcg) by mouth 1 (one) time per week in the early morning.. weekly      DULoxetine (Cymbalta) 60 mg DR capsule Take 1 capsule (60 mg) by mouth once daily. 90 capsule 1    gabapentin (Neurontin) 100 mg capsule Take 1 capsule (100 mg) by mouth 2 times a day. 180 capsule 2    glucosam/chond/hyalu/CF borate (MOVE FREE JOINT HEALTH ORAL) Take 1 tablet by mouth once daily.      L. acidophilus/Bifid. animalis 15.5 billion cell capsule 1 cap(s) orally once a day      loratadine (Claritin) 10 mg tablet        losartan (Cozaar) 50 mg tablet TAKE 1 TABLET ONCE DAILY 90 tablet 2    magnesium oxide 500 mg tablet TAKE 1 TABLET BY MOUTH DAILY WITH FOOD 90 tablet 2    melatonin 5 mg tablet 1 tablet in the evening Orally Once a day      meloxicam (Mobic) 15 mg tablet Take 1 tablet (15 mg) by mouth once daily. 90 tablet 3    omeprazole (PriLOSEC) 20 mg DR capsule Take 1 capsule (20 mg) by mouth once daily. 90 capsule 3    propranolol (Inderal) 20 mg tablet 2 tablets in morning,  2 tablet in afternoon Orally daily 360 tablet 2    vit C,D-Fc-btfbh-lutein-zeaxan (PreserVision AREDS-2) 250-90-40-1 mg capsule 1 cap(s) orally once a day      [DISCONTINUED] docusate sodium (Colace) 100 mg capsule Take 1 capsule (100 mg) by mouth 2 times a day as needed for constipation.       No current facility-administered medications on file prior to visit.     Immunization History   Administered Date(s) Administered    Flu vaccine, quadrivalent, high-dose, preservative free, age 65y+ (FLUZONE) 11/16/2021, 10/24/2022, 11/13/2023    Flu vaccine, trivalent, preservative free, HIGH-DOSE, age 65y+ (Fluzone) 10/26/2016, 09/19/2017, 12/12/2018, 10/01/2019    Influenza, Seasonal,  Quadrivalent, Adjuvanted 10/03/2020    Influenza, Unspecified 10/24/2023    Influenza, seasonal, injectable 10/13/2010, 12/16/2011, 10/15/2012    Influenza, trivalent, adjuvanted 11/19/2019, 09/23/2024    Pfizer COVID-19 vaccine, 12 years and older, (30mcg/0.3mL) (Comirnaty) 11/13/2023, 09/23/2024    Pfizer COVID-19 vaccine, bivalent, age 12 years and older (30 mcg/0.3 mL) 09/15/2022    Pfizer Gray Cap SARS-CoV-2 04/13/2022    Pfizer Purple Cap SARS-CoV-2 02/24/2021, 03/23/2021, 10/05/2021    Pneumococcal conjugate vaccine, 13-valent (PREVNAR 13) 01/01/2016    Pneumococcal polysaccharide vaccine, 23-valent, age 2 years and older (PNEUMOVAX 23) 06/28/2012, 09/19/2017    Td vaccine, age 7 years and older (TDVAX) 10/01/2002    Tdap vaccine, age 7 year and older (BOOSTRIX, ADACEL) 12/16/2011, 05/29/2020    Zoster vaccine, recombinant, adult (SHINGRIX) 09/15/2022, 05/23/2023    Zoster, live 11/01/2009     Patient's medical history was reviewed and updated either before or during this encounter.       Lu Roldan MD

## 2024-09-27 ENCOUNTER — DOCUMENTATION (OUTPATIENT)
Dept: PHYSICAL THERAPY | Facility: CLINIC | Age: 79
End: 2024-09-27
Payer: COMMERCIAL

## 2024-09-27 NOTE — PROGRESS NOTES
"Physical Therapy    Discharge Summary    Name: Nuria Valdivia \"Maria M\"  MRN: [id number]  : 1945  Date: 24    Discharge Summary: PT    Discharge Information: Date of discharge 24, Date of last visit 7/10/24, and Date of evaluation 5/15/24    Therapy Summary: Pt challenged during therapy with intermittent progress, at last session decided to hold therapy for pt to return to MD, did not return for further treatment.     Discharge Status: goals not met, returning to MD     Rehab Discharge Reason: Failed to schedule and/or keep follow-up appointment(s)  "

## 2024-09-30 DIAGNOSIS — M48.062 SPINAL STENOSIS OF LUMBAR REGION WITH NEUROGENIC CLAUDICATION: ICD-10-CM

## 2024-10-01 RX ORDER — DULOXETIN HYDROCHLORIDE 60 MG/1
60 CAPSULE, DELAYED RELEASE ORAL DAILY
Qty: 90 CAPSULE | Refills: 1 | Status: SHIPPED | OUTPATIENT
Start: 2024-10-01

## 2024-10-08 PROBLEM — E66.01 SEVERE OBESITY (BMI >= 40) (MULTI): Status: RESOLVED | Noted: 2023-09-15 | Resolved: 2024-10-08

## 2024-10-11 ENCOUNTER — ANCILLARY PROCEDURE (OUTPATIENT)
Dept: RADIOLOGY | Facility: EXTERNAL LOCATION | Age: 79
End: 2024-10-11
Payer: COMMERCIAL

## 2024-10-11 DIAGNOSIS — M48.061 SPINAL STENOSIS, LUMBAR REGION WITHOUT NEUROGENIC CLAUDICATION: ICD-10-CM

## 2024-10-11 NOTE — PROGRESS NOTES
Procedure Note: 10/11/2024    PROCEDURE NAME: Caudal Epidural Steroid Injection (Procedure abandoned)    Patient Information: Nuria Valdivia, MRN (from [facility]) [id number], : 1945  Chief Complaint: Low back and leg pain    Pain Diagnosis: The diagnosis of Spinal stenosis, lumbar region without neurogenic claudication  has been made given the patient's clinical evaluation at prior evaluation.      DESCRIPTION OF PROCEDURE:    Nuria Valdivia was brought to the procedure room. The assistant obtained vital signs, which were found to be stable. She was then informed of the procedure, its benefits, and its risks, and her questions were answered regarding the procedure. Written informed consent was obtained from the patient. Immediately prior to starting the procedure, a time-out safety check was conducted and the patient's identification, procedure name, and procedure site were confirmed with the patient.    Caudal Epidural Steroid injection (procedure abandoned):  A time out was performed to confirm the correct site, laterality, and procedure. Patient was placed in the prone position. A BP cuff and oxygen saturation monitors were attached and the patient was monitored throughout the procedure. The skin was prepped with Chloroprep and draped in a sterile fashion    Lateral fluoroscopy was used to visualize the sacrum, the sacral hitaus, sacro-coccygeal junction and coccyx. The target point for needle placement was the sacral hiatus. The skin and subcutaneous tissue were anesthetized using 10 mL of 1% plain lidocaine using a 1.5 inch 25-gauge needle. Next, we advanced a 3.5 inch 20-gauge Touhy needle towards the target area. After multiple attempts, I was unable to enter the sacral hiatus and get caudal epidural spread. The procedure was abandoned    Anesthesia: local anesthesia   Complications: none      Remberto Mclaughlin MD

## 2024-10-31 ENCOUNTER — APPOINTMENT (OUTPATIENT)
Dept: PAIN MEDICINE | Facility: CLINIC | Age: 79
End: 2024-10-31
Payer: COMMERCIAL

## 2025-03-27 ENCOUNTER — APPOINTMENT (OUTPATIENT)
Dept: PRIMARY CARE | Facility: CLINIC | Age: 80
End: 2025-03-27
Payer: COMMERCIAL

## 2025-03-27 DIAGNOSIS — I10 BENIGN HYPERTENSION: ICD-10-CM

## 2025-03-27 RX ORDER — PROPRANOLOL HYDROCHLORIDE 20 MG/1
TABLET ORAL
Qty: 360 TABLET | Refills: 0 | Status: SHIPPED | OUTPATIENT
Start: 2025-03-27

## 2025-03-27 NOTE — TELEPHONE ENCOUNTER
LV 9/25/24, [place] 5/20/25    Propranolol 20 mg    Pt has new ins and new mail order pharm for 2025.  Please send to [facility].

## 2025-04-30 ENCOUNTER — HOSPITAL ENCOUNTER (OUTPATIENT)
Dept: RADIOLOGY | Facility: HOSPITAL | Age: 80
Discharge: HOME | End: 2025-04-30
Payer: MEDICARE

## 2025-04-30 VITALS — HEIGHT: 63 IN | WEIGHT: 240 LBS | BODY MASS INDEX: 42.52 KG/M2

## 2025-04-30 DIAGNOSIS — Z12.31 ENCOUNTER FOR SCREENING MAMMOGRAM FOR MALIGNANT NEOPLASM OF BREAST: ICD-10-CM

## 2025-04-30 PROCEDURE — 77067 SCR MAMMO BI INCL CAD: CPT | Performed by: STUDENT IN AN ORGANIZED HEALTH CARE EDUCATION/TRAINING PROGRAM

## 2025-04-30 PROCEDURE — 77067 SCR MAMMO BI INCL CAD: CPT

## 2025-04-30 PROCEDURE — 77063 BREAST TOMOSYNTHESIS BI: CPT | Performed by: STUDENT IN AN ORGANIZED HEALTH CARE EDUCATION/TRAINING PROGRAM

## 2025-05-20 ENCOUNTER — APPOINTMENT (OUTPATIENT)
Dept: PRIMARY CARE | Facility: CLINIC | Age: 80
End: 2025-05-20
Payer: COMMERCIAL